# Patient Record
Sex: FEMALE | Race: BLACK OR AFRICAN AMERICAN | NOT HISPANIC OR LATINO | Employment: FULL TIME | RURAL
[De-identification: names, ages, dates, MRNs, and addresses within clinical notes are randomized per-mention and may not be internally consistent; named-entity substitution may affect disease eponyms.]

---

## 2017-07-27 ENCOUNTER — OFFICE VISIT (OUTPATIENT)
Dept: FAMILY MEDICINE CLINIC | Facility: CLINIC | Age: 27
End: 2017-07-27

## 2017-07-27 VITALS
TEMPERATURE: 98.7 F | SYSTOLIC BLOOD PRESSURE: 110 MMHG | HEART RATE: 99 BPM | RESPIRATION RATE: 16 BRPM | HEIGHT: 66 IN | DIASTOLIC BLOOD PRESSURE: 70 MMHG | WEIGHT: 138.4 LBS | BODY MASS INDEX: 22.24 KG/M2

## 2017-07-27 DIAGNOSIS — R51.9 NONINTRACTABLE HEADACHE, UNSPECIFIED CHRONICITY PATTERN, UNSPECIFIED HEADACHE TYPE: Primary | ICD-10-CM

## 2017-07-27 DIAGNOSIS — Z71.6 TOBACCO ABUSE COUNSELING: ICD-10-CM

## 2017-07-27 DIAGNOSIS — Z72.0 TOBACCO USER: ICD-10-CM

## 2017-07-27 DIAGNOSIS — Z13.1 ENCOUNTER FOR SCREENING FOR DIABETES MELLITUS: ICD-10-CM

## 2017-07-27 DIAGNOSIS — Z13.6 ENCOUNTER FOR SCREENING FOR CARDIOVASCULAR DISORDERS: ICD-10-CM

## 2017-07-27 DIAGNOSIS — Z13.29 ENCOUNTER FOR SCREENING FOR ENDOCRINE DISORDER: ICD-10-CM

## 2017-07-27 PROCEDURE — 99204 OFFICE O/P NEW MOD 45 MIN: CPT | Performed by: FAMILY MEDICINE

## 2017-07-27 RX ORDER — TOPIRAMATE 25 MG/1
25 TABLET ORAL 2 TIMES DAILY
Qty: 30 TABLET | Refills: 11 | Status: SHIPPED | OUTPATIENT
Start: 2017-07-27 | End: 2017-10-05 | Stop reason: SDUPTHER

## 2017-07-27 RX ORDER — IBUPROFEN 600 MG/1
600 TABLET ORAL EVERY 6 HOURS PRN
COMMUNITY
End: 2019-03-20

## 2017-07-27 RX ORDER — NICOTINE 21 MG/24HR
1 PATCH, TRANSDERMAL 24 HOURS TRANSDERMAL EVERY 24 HOURS
Qty: 30 PATCH | Refills: 11 | Status: SHIPPED | OUTPATIENT
Start: 2017-07-27 | End: 2017-10-05 | Stop reason: SDUPTHER

## 2017-07-27 NOTE — PROGRESS NOTES
"Subjective   Shakeetra Fox Yanez is a 26 y.o. female.     History of Present Illness     Problem List  1. Headaches   2. Tobacco smoker (1/2 ppd)    PSHx  C section X 3    Family Hx  -mother - hypertension, diabetes  -father -unknown  -siblings - asthma, PTSD, hearing problems    Social Hx  - ()  -3 children  -nonalcoholic  -tobacco smoker  -Works at [employer]     Pt is 25 yo female who I am seeing for the first time. She is here to establish. Pt states she is overall healthy but has been suffering from headaches for the past month.  States it is bilateral frontal area and occur about 3 times a week and each episode would last for a day.  Does have photophobia and phonophobia.  Pt does smoke 1/2 of tobacco. Also does not drink enough fluids. Only drinks 2 bottles of water a day.  Has yet to have labwork.  No weakness. Does get fatigued and tired easily.  Pt does not drink alcohol. Works at [employer]         The following portions of the patient's history were reviewed and updated as appropriate: allergies, current medications, past family history, past medical history, past social history, past surgical history and problem list.    Review of Systems   Constitutional: Positive for fatigue.   Eyes: Negative.    Respiratory: Negative.    Cardiovascular: Negative.    Gastrointestinal: Negative.    Endocrine: Negative.    Genitourinary: Negative.    Musculoskeletal: Negative.    Skin: Negative.    Allergic/Immunologic: Negative.    Neurological: Positive for headaches.   Hematological: Negative.    Psychiatric/Behavioral: Negative.        Objective    /70  Pulse 99  Temp 98.7 °F (37.1 °C)  Resp 16  Ht 66\" (167.6 cm)  Wt 138 lb 6.4 oz (62.8 kg)  BMI 22.34 kg/m2    No results found for any previous visit.    Physical Exam   Constitutional: She is oriented to person, place, and time. She appears well-developed and well-nourished. No distress.   HENT:   Head: Normocephalic and " atraumatic.   Right Ear: External ear normal.   Left Ear: External ear normal.   Eyes: Conjunctivae and EOM are normal. Pupils are equal, round, and reactive to light. Right eye exhibits no discharge. Left eye exhibits no discharge. No scleral icterus.   Neck: Normal range of motion. Neck supple. No JVD present. No tracheal deviation present. No thyromegaly present.   Cardiovascular: Normal rate, regular rhythm and normal heart sounds.    Pulmonary/Chest: Effort normal and breath sounds normal. No stridor. No respiratory distress. She has no wheezes.   Abdominal: Soft. Bowel sounds are normal. She exhibits no distension and no mass. There is no tenderness. There is no rebound and no guarding. No hernia.   Musculoskeletal: Normal range of motion. She exhibits no edema, tenderness or deformity.   Lymphadenopathy:     She has no cervical adenopathy.   Neurological: She is alert and oriented to person, place, and time. She has normal reflexes. No cranial nerve deficit. Coordination normal.   CN 2-12 intact    Skin: Skin is warm and dry. No rash noted. She is not diaphoretic. No erythema. No pallor.   Psychiatric: She has a normal mood and affect. Her behavior is normal.   Nursing note and vitals reviewed.      Assessment/Plan   Problems Addressed this Visit        Nervous and Auditory    Nonintractable headache - Primary       Other    Encounter for screening for endocrine disorder    Encounter for screening for cardiovascular disorders    Encounter for screening for diabetes mellitus    Relevant Orders    CBC Auto Differential    Comprehensive Metabolic Panel    Hemoglobin A1c    TSH    T4, Free    T3, Free    Lipid Panel    Tobacco abuse counseling    Tobacco user      - for headaches -Possible headaches are migraines.  Gave information on headaches to go home with and migraines. Recommend pt drink enough water and keep headache diary.  Consider MRI of brain if it gets worse. Recommend tobacco cessation.  Will start  on topamax 25 mg PO BID. Drug information given  - will get hga1c, thyroid studies and lipid panel for diabetes , endocrine and cardiovascular disorder screening  - tobacco user - counseled pt to quit >5 minutes.  Nicotine patch 21 mg /daily  - recheck in 1 month          This document has been electronically signed by Alvaro Vogel MD on July 27, 2017 9:59 AM

## 2017-07-27 NOTE — PATIENT INSTRUCTIONS
DRINK A LOT OF WATER  GET LABWORK  QUIT SMOKING    Come back in 1 month     Migraine Headache  A migraine headache is an intense, throbbing pain on one or both sides of your head. A migraine can last for 30 minutes to several hours.  CAUSES   The exact cause of a migraine headache is not always known. However, a migraine may be caused when nerves in the brain become irritated and release chemicals that cause inflammation. This causes pain.  Certain things may also trigger migraines, such as:  · Alcohol.  · Smoking.  · Stress.  · Menstruation.  · Aged cheeses.  · Foods or drinks that contain nitrates, glutamate, aspartame, or tyramine.  · Lack of sleep.  · Chocolate.  · Caffeine.  · Hunger.  · Physical exertion.  · Fatigue.  · Medicines used to treat chest pain (nitroglycerine), birth control pills, estrogen, and some blood pressure medicines.  SIGNS AND SYMPTOMS  · Pain on one or both sides of your head.  · Pulsating or throbbing pain.  · Severe pain that prevents daily activities.  · Pain that is aggravated by any physical activity.  · Nausea, vomiting, or both.  · Dizziness.  · Pain with exposure to bright lights, loud noises, or activity.  · General sensitivity to bright lights, loud noises, or smells.  Before you get a migraine, you may get warning signs that a migraine is coming (aura). An aura may include:  · Seeing flashing lights.  · Seeing bright spots, halos, or zigzag lines.  · Having tunnel vision or blurred vision.  · Having feelings of numbness or tingling.  · Having trouble talking.  · Having muscle weakness.  DIAGNOSIS   A migraine headache is often diagnosed based on:  · Symptoms.  · Physical exam.  · A CT scan or MRI of your head. These imaging tests cannot diagnose migraines, but they can help rule out other causes of headaches.  TREATMENT  Medicines may be given for pain and nausea. Medicines can also be given to help prevent recurrent migraines.   HOME CARE INSTRUCTIONS  · Only take  over-the-counter or prescription medicines for pain or discomfort as directed by your health care provider. The use of long-term narcotics is not recommended.  · Lie down in a dark, quiet room when you have a migraine.  · Keep a journal to find out what may trigger your migraine headaches. For example, write down:    What you eat and drink.    How much sleep you get.    Any change to your diet or medicines.  · Limit alcohol consumption.  · Quit smoking if you smoke.  · Get 7-9 hours of sleep, or as recommended by your health care provider.  · Limit stress.  · Keep lights dim if bright lights bother you and make your migraines worse.  SEEK IMMEDIATE MEDICAL CARE IF:   · Your migraine becomes severe.  · You have a fever.  · You have a stiff neck.  · You have vision loss.  · You have muscular weakness or loss of muscle control.  · You start losing your balance or have trouble walking.  · You feel faint or pass out.  · You have severe symptoms that are different from your first symptoms.  MAKE SURE YOU:   · Understand these instructions.  · Will watch your condition.  · Will get help right away if you are not doing well or get worse.     This information is not intended to replace advice given to you by your health care provider. Make sure you discuss any questions you have with your health care provider.     Document Released: 12/18/2006 Document Revised: 01/08/2016 Document Reviewed: 08/25/2014  [employer] Interactive Patient Education ©2017 [employer]  Topiramate tablets  What is this medicine?  TOPIRAMATE (toe PYRE a mate) is used to treat seizures in adults or children with epilepsy. It is also used for the prevention of migraine headaches.  This medicine may be used for other purposes; ask your health care provider or pharmacist if you have questions.  COMMON BRAND NAME(S): Topamax, Topiragen  What should I tell my health care provider before I take this medicine?  They need to know if you have any of these  conditions:  -bleeding disorders  -cirrhosis of the liver or liver disease  -diarrhea  -glaucoma  -kidney stones or kidney disease  -low blood counts, like low white cell, platelet, or red cell counts  -lung disease like asthma, obstructive pulmonary disease, emphysema  -metabolic acidosis  -on a ketogenic diet  -schedule for surgery or a procedure  -suicidal thoughts, plans, or attempt; a previous suicide attempt by you or a family member  -an unusual or allergic reaction to topiramate, other medicines, foods, dyes, or preservatives  -pregnant or trying to get pregnant  -breast-feeding  How should I use this medicine?  Take this medicine by mouth with a glass of water. Follow the directions on the prescription label. Do not crush or chew. You may take this medicine with meals. Take your medicine at regular intervals. Do not take it more often than directed.  Talk to your pediatrician regarding the use of this medicine in children. Special care may be needed. While this drug may be prescribed for children as young as 2 years of age for selected conditions, precautions do apply.  Overdosage: If you think you have taken too much of this medicine contact a poison control center or emergency room at once.  NOTE: This medicine is only for you. Do not share this medicine with others.  What if I miss a dose?  If you miss a dose, take it as soon as you can. If your next dose is to be taken in less than 6 hours, then do not take the missed dose. Take the next dose at your regular time. Do not take double or extra doses.  What may interact with this medicine?  Do not take this medicine with any of the following medications:  -probenecid  This medicine may also interact with the following medications:  -acetazolamide  -alcohol  -amitriptyline  -aspirin and aspirin-like medicines  -birth control pills  -certain medicines for depression  -certain medicines for seizures  -certain medicines that treat or prevent blood clots like  warfarin, enoxaparin, dalteparin, apixaban, dabigatran, and rivaroxaban  -digoxin  -hydrochlorothiazide  -lithium  -medicines for pain, sleep, or muscle relaxation  -metformin  -methazolamide  -NSAIDS, medicines for pain and inflammation, like ibuprofen or naproxen  -pioglitazone  -risperidone  This list may not describe all possible interactions. Give your health care provider a list of all the medicines, herbs, non-prescription drugs, or dietary supplements you use. Also tell them if you smoke, drink alcohol, or use illegal drugs. Some items may interact with your medicine.  What should I watch for while using this medicine?  Visit your doctor or health care professional for regular checks on your progress. Do not stop taking this medicine suddenly. This increases the risk of seizures if you are using this medicine to control epilepsy. Wear a medical identification bracelet or chain to say you have epilepsy or seizures, and carry a card that lists all your medicines.  This medicine can decrease sweating and increase your body temperature. Watch for signs of  sweating or fever, especially in children. Avoid extreme heat, hot baths, and saunas. Be careful about exercising, especially in hot weather. Contact your health care provider right away if you notice a fever or decrease in sweating.  You should drink plenty of fluids while taking this medicine. If you have had kidney stones in the past, this will help to reduce your chances of forming kidney stones.  If you have stomach pain, with nausea or vomiting and yellowing of your eyes or skin, call your doctor immediately.  You may get drowsy, dizzy, or have blurred vision. Do not drive, use machinery, or do anything that needs mental alertness until you know how this medicine affects you. To reduce dizziness, do not sit or stand up quickly, especially if you are an older patient. Alcohol can increase drowsiness and dizziness. Avoid alcoholic drinks.  If you  notice blurred vision, eye pain, or other eye problems, seek medical attention at once for an eye exam.  The use of this medicine may increase the chance of suicidal thoughts or actions. Pay special attention to how you are responding while on this medicine. Any worsening of mood, or thoughts of suicide or dying should be reported to your health care professional right away.  This medicine may increase the chance of developing metabolic acidosis. If left untreated, this can cause kidney stones, bone disease, or slowed growth in children. Symptoms include breathing fast, fatigue, loss of appetite, irregular heartbeat, or loss of consciousness. Call your doctor immediately if you experience any of these side effects. Also, tell your doctor about any surgery you plan on having while taking this medicine since this may increase your risk for metabolic acidosis.  Birth control pills may not work properly while you are taking this medicine. Talk to your doctor about using an extra method of birth control.  Women who become pregnant while using this medicine may enroll in the [place] Antiepileptic Drug Pregnancy Registry by calling 1-844.295.9668. This registry collects information about the safety of antiepileptic drug use during pregnancy.  What side effects may I notice from receiving this medicine?  Side effects that you should report to your doctor or health care professional as soon as possible:  -allergic reactions like skin rash, itching or hives, swelling of the face, lips, or tongue  -decreased sweating and/or rise in body temperature  -depression  -difficulty breathing, fast or irregular breathing patterns  -difficulty speaking  -difficulty walking or controlling muscle movements  -hearing impairment  -redness, blistering, peeling or loosening of the skin, including inside the mouth  -tingling, pain or numbness in the hands or feet  -unusual bleeding or bruising  -unusually weak or tired  -worsening of  mood, thoughts or actions of suicide or dying  Side effects that usually do not require medical attention (report to your doctor or health care professional if they continue or are bothersome):  -altered taste  -back pain, joint or muscle aches and pains  -diarrhea, or constipation  -headache  -loss of appetite  -nausea  -stomach upset, indigestion  -tremors  This list may not describe all possible side effects. Call your doctor for medical advice about side effects. You may report side effects to FDA at 7-920-UMR-7089.  Where should I keep my medicine?  Keep out of the reach of children.  Store at room temperature between 15 and 30 degrees C (59 and 86 degrees F) in a tightly closed container. Protect from moisture. Throw away any unused medicine after the expiration date.  NOTE: This sheet is a summary. It may not cover all possible information. If you have questions about this medicine, talk to your doctor, pharmacist, or health care provider.     © 2017, [employer]. (2014-12-22 23:17:57)

## 2017-09-05 ENCOUNTER — TELEPHONE (OUTPATIENT)
Dept: FAMILY MEDICINE CLINIC | Facility: CLINIC | Age: 27
End: 2017-09-05

## 2017-10-05 ENCOUNTER — OFFICE VISIT (OUTPATIENT)
Dept: FAMILY MEDICINE CLINIC | Facility: CLINIC | Age: 27
End: 2017-10-05

## 2017-10-05 VITALS
HEIGHT: 66 IN | TEMPERATURE: 98.7 F | SYSTOLIC BLOOD PRESSURE: 110 MMHG | WEIGHT: 152 LBS | HEART RATE: 98 BPM | BODY MASS INDEX: 24.43 KG/M2 | DIASTOLIC BLOOD PRESSURE: 60 MMHG | RESPIRATION RATE: 16 BRPM

## 2017-10-05 DIAGNOSIS — Z23 NEED FOR VACCINATION: ICD-10-CM

## 2017-10-05 DIAGNOSIS — R51.9 NONINTRACTABLE HEADACHE, UNSPECIFIED CHRONICITY PATTERN, UNSPECIFIED HEADACHE TYPE: ICD-10-CM

## 2017-10-05 DIAGNOSIS — Z72.0 TOBACCO USER: ICD-10-CM

## 2017-10-05 DIAGNOSIS — Z13.6 ENCOUNTER FOR SCREENING FOR CARDIOVASCULAR DISORDERS: ICD-10-CM

## 2017-10-05 DIAGNOSIS — Z13.1 ENCOUNTER FOR SCREENING FOR DIABETES MELLITUS: Primary | ICD-10-CM

## 2017-10-05 DIAGNOSIS — Z13.29 ENCOUNTER FOR SCREENING FOR ENDOCRINE DISORDER: ICD-10-CM

## 2017-10-05 PROCEDURE — 90471 IMMUNIZATION ADMIN: CPT | Performed by: FAMILY MEDICINE

## 2017-10-05 PROCEDURE — 90732 PPSV23 VACC 2 YRS+ SUBQ/IM: CPT | Performed by: FAMILY MEDICINE

## 2017-10-05 PROCEDURE — 90472 IMMUNIZATION ADMIN EACH ADD: CPT | Performed by: FAMILY MEDICINE

## 2017-10-05 PROCEDURE — 99213 OFFICE O/P EST LOW 20 MIN: CPT | Performed by: FAMILY MEDICINE

## 2017-10-05 PROCEDURE — 90715 TDAP VACCINE 7 YRS/> IM: CPT | Performed by: FAMILY MEDICINE

## 2017-10-05 RX ORDER — TOPIRAMATE 25 MG/1
25 TABLET ORAL 2 TIMES DAILY
Qty: 30 TABLET | Refills: 11 | Status: SHIPPED | OUTPATIENT
Start: 2017-10-05 | End: 2019-03-20

## 2017-10-05 RX ORDER — NICOTINE 21 MG/24HR
1 PATCH, TRANSDERMAL 24 HOURS TRANSDERMAL EVERY 24 HOURS
Qty: 30 PATCH | Refills: 11 | Status: SHIPPED | OUTPATIENT
Start: 2017-10-05 | End: 2018-04-26 | Stop reason: SDUPTHER

## 2017-10-05 NOTE — PROGRESS NOTES
Subjective   Shakeetra Fox Yanez is a 27 y.o. female.       Problem List  1. Headaches   2. Tobacco smoker (1/2 ppd)     PSHx  C section X 3     Family Hx  -mother - hypertension, diabetes  -father -unknown  -siblings - asthma, PTSD, hearing problems     Social Hx  - ()  -3 children  -nonalcoholic  -tobacco smoker smokes 1/2 ppd   -Works at [employer]     Pt is 26 yo female with the above medical issues. Is here for recheck. Pt on last visit was started on topamax 25 mg PO BID for headaches. Pt states medication is helping and since taking medication her headaches have improved.  Denies any migraine headaches. Did not get labwork rom last visit. Pt stopped smoking and tried nicotine patches but ran out.  She has gone back to smoke a few cigarettes a day    Headache    This is a recurrent problem. The current episode started more than 1 year ago. The problem has been resolved. The pain is located in the bilateral region. The pain is at a severity of 0/10. The patient is experiencing no pain. Pertinent negatives include no abdominal pain, abnormal behavior, anorexia, back pain, blurred vision, coughing, dizziness, drainage, ear pain, eye pain, eye redness, eye watering, facial sweating, fever, hearing loss, insomnia, loss of balance, muscle aches, nausea, neck pain, numbness, phonophobia, photophobia, rhinorrhea, scalp tenderness, sinus pressure, sore throat, swollen glands, tingling, tinnitus, visual change, vomiting, weakness or weight loss. Nothing aggravates the symptoms. Treatments tried: topamax 25 mg PO BID  The treatment provided no relief. There is no history of cancer, cluster headaches, hypertension, immunosuppression, migraine headaches, migraines in the family, obesity, pseudotumor cerebri, recent head traumas, sinus disease or TMJ.        The following portions of the patient's history were reviewed and updated as appropriate: allergies, current medications, past family  "history, past medical history, past social history, past surgical history and problem list.    Review of Systems   Constitutional: Negative.  Negative for fever and weight loss.   HENT: Negative for ear pain, hearing loss, rhinorrhea, sinus pressure, sore throat and tinnitus.    Eyes: Negative.  Negative for blurred vision, photophobia, pain and redness.   Respiratory: Negative.  Negative for cough.    Cardiovascular: Negative.    Gastrointestinal: Negative.  Negative for abdominal pain, anorexia, nausea and vomiting.   Endocrine: Negative.    Genitourinary: Negative.    Musculoskeletal: Negative.  Negative for back pain and neck pain.   Skin: Negative.    Allergic/Immunologic: Negative.    Neurological: Positive for headaches. Negative for dizziness, tingling, weakness, numbness and loss of balance.   Hematological: Negative.    Psychiatric/Behavioral: Negative.  The patient does not have insomnia.        Objective    /60  Pulse 98  Temp 98.7 °F (37.1 °C)  Resp 16  Ht 66\" (167.6 cm)  Wt 152 lb (68.9 kg)  BMI 24.53 kg/m2      Chemistry    No results found for: NA, K, CL, CO2, BUN, CREATININE, GLU No results found for: CALCIUM, ALKPHOS, AST, ALT, BILITOT     .  Physical Exam   Constitutional: She is oriented to person, place, and time. She appears well-developed and well-nourished. No distress.   HENT:   Head: Normocephalic.   Right Ear: External ear normal.   Left Ear: External ear normal.   Eyes: Conjunctivae and EOM are normal. Pupils are equal, round, and reactive to light. Right eye exhibits no discharge. Left eye exhibits no discharge. No scleral icterus.   Neck: Normal range of motion. Neck supple. No JVD present. No tracheal deviation present. No thyromegaly present.   Cardiovascular: Normal rate and regular rhythm.    Pulmonary/Chest: Effort normal and breath sounds normal. No stridor. No respiratory distress. She has no wheezes.   Abdominal: Soft. Bowel sounds are normal. She exhibits no " distension and no mass. There is no tenderness. There is no rebound and no guarding. No hernia.   Musculoskeletal: Normal range of motion. She exhibits no edema, tenderness or deformity.   Lymphadenopathy:     She has no cervical adenopathy.   Neurological: She is alert and oriented to person, place, and time. She has normal reflexes. No cranial nerve deficit. Coordination normal.   Skin: Skin is warm and dry. No rash noted. She is not diaphoretic. No erythema. No pallor.   Psychiatric: She has a normal mood and affect. Her behavior is normal.   Nursing note and vitals reviewed.      Assessment/Plan   Problems Addressed this Visit        Nervous and Auditory    Nonintractable headache       Other    Encounter for screening for endocrine disorder    Encounter for screening for cardiovascular disorders    Encounter for screening for diabetes mellitus - Primary    Relevant Orders    CBC Auto Differential    Comprehensive Metabolic Panel    Hemoglobin A1c    Lipid Panel    TSH    T4, Free    T3, Free    Tobacco user      - for headaches - continue topamax 25 mg PO BId  - reordered labwork for diabetes, endocrine and cardiovascular disorder screening  - tobacco user - nicotine patches reordered. Recommended 1 800 QUIT NOW  - Pt agreed to Tdap and pneumovax 23 vaccination today.    - recheck in 3 months or earlier if any problems arise

## 2018-04-26 ENCOUNTER — TELEPHONE (OUTPATIENT)
Dept: FAMILY MEDICINE CLINIC | Facility: CLINIC | Age: 28
End: 2018-04-26

## 2018-04-26 RX ORDER — NICOTINE 21 MG/24HR
1 PATCH, TRANSDERMAL 24 HOURS TRANSDERMAL EVERY 24 HOURS
Qty: 30 PATCH | Refills: 3 | Status: SHIPPED | OUTPATIENT
Start: 2018-04-26 | End: 2019-03-20

## 2019-03-19 PROBLEM — Z12.4 ENCOUNTER FOR PAPANICOLAOU SMEAR OF CERVIX: Status: ACTIVE | Noted: 2019-03-19

## 2019-03-19 NOTE — PROGRESS NOTES
Subjective   Shakeetra Fox Yanez is a 28 y.o. female.       Problem List  1. Headaches   2. Tobacco smoker (1/2 ppd)     PSHx  C section X 3     Family Hx  -mother - hypertension, diabetes  -father -unknown  -siblings - asthma, PTSD, hearing problems     Social Hx  - ()  -3 children  -nonalcoholic  -tobacco smoker smokes 1/2 ppd   -Works at [employer]     Pt is 26 yo female with the above medical issues. Is here for recheck. Pt on last visit was started on topamax 25 mg PO BID for headaches. Pt states medication is helping and since taking medication her headaches have improved.  Denies any migraine headaches. Did not get labwork rom last visit. Pt stopped smoking and tried nicotine patches but ran out.  She has gone back to smoke a few cigarettes a day    3/20/19 pt is here for recheck and followup on tobacco use and headaches. SHe is due for new labwork. She is due for pap smear. She is overall doing well. Her only issue is sore throat for past 2 days. Denies any fever. 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      Sore Throat    This is a new problem. The current episode started in the past 7 days. The problem has been gradually worsening. Neither side of throat is experiencing more pain than the other. There has been no fever. The pain is at a severity of 3/10. The pain is mild. Pertinent negatives include no abdominal pain, congestion, coughing, diarrhea, drooling, ear discharge, ear pain, headaches, hoarse voice, plugged ear sensation, neck pain, shortness of breath, stridor, swollen glands, trouble swallowing or vomiting. She has had no exposure to strep or mono.        The following portions of the patient's history were reviewed and updated as appropriate: allergies, current medications, past family history, past medical history, past social history, past surgical history and problem list.  Patient Active Problem List   Diagnosis   • Encounter for screening for endocrine  "disorder   • Encounter for screening for cardiovascular disorders   • Nonintractable headache   • Encounter for screening for diabetes mellitus   • Tobacco abuse counseling   • Tobacco user   • Encounter for Papanicolaou smear of cervix   • Acute pharyngitis     Current Outpatient Medications on File Prior to Visit   Medication Sig Dispense Refill   • [DISCONTINUED] ibuprofen (ADVIL,MOTRIN) 600 MG tablet Take 600 mg by mouth Every 6 (Six) Hours As Needed for Mild Pain (1-3).     • [DISCONTINUED] nicotine (NICODERM CQ) 21 MG/24HR patch Place 1 patch on the skin Daily. 30 patch 3   • [DISCONTINUED] topiramate (TOPAMAX) 25 MG tablet Take 1 tablet by mouth 2 (Two) Times a Day. 30 tablet 11     No current facility-administered medications on file prior to visit.        Review of Systems   Constitutional: Negative.    HENT: Positive for sore throat. Negative for congestion, drooling, ear discharge, ear pain, hoarse voice and trouble swallowing.    Eyes: Negative.    Respiratory: Negative.  Negative for cough, shortness of breath and stridor.    Cardiovascular: Negative.    Gastrointestinal: Negative.  Negative for abdominal pain, diarrhea and vomiting.   Endocrine: Negative.    Genitourinary: Negative.    Musculoskeletal: Negative.  Negative for neck pain.   Skin: Negative.    Allergic/Immunologic: Negative.    Neurological: Negative for headaches.   Hematological: Negative.    Psychiatric/Behavioral: Negative.        Objective    /72   Pulse 100   Temp 98.8 °F (37.1 °C)   Ht 170.2 cm (67\")   Wt 66.3 kg (146 lb 3.2 oz)   SpO2 100%   BMI 22.90 kg/m²     /72   Pulse 100   Temp 98.8 °F (37.1 °C)   Ht 170.2 cm (67\")   Wt 66.3 kg (146 lb 3.2 oz)   SpO2 100%   BMI 22.90 kg/m²       Chemistry    No results found for: NA, K, CL, CO2, BUN, CREATININE, GLU No results found for: CALCIUM, ALKPHOS, AST, ALT, BILITOT     .  Physical Exam   Constitutional: She is oriented to person, place, and time. She appears " well-developed and well-nourished. No distress.   HENT:   Head: Normocephalic.   Right Ear: External ear normal.   Left Ear: External ear normal.   Eyes: Conjunctivae and EOM are normal. Pupils are equal, round, and reactive to light. Right eye exhibits no discharge. Left eye exhibits no discharge. No scleral icterus.   Neck: Normal range of motion. Neck supple. No JVD present. No tracheal deviation present. No thyromegaly present.   Cardiovascular: Normal rate and regular rhythm.   Pulmonary/Chest: Effort normal and breath sounds normal. No stridor. No respiratory distress. She has no wheezes.   Abdominal: Soft. Bowel sounds are normal. She exhibits no distension and no mass. There is no tenderness. There is no rebound and no guarding. No hernia.   Musculoskeletal: Normal range of motion. She exhibits no edema, tenderness or deformity.   Lymphadenopathy:     She has no cervical adenopathy.   Neurological: She is alert and oriented to person, place, and time. She has normal reflexes. No cranial nerve deficit. Coordination normal.   Skin: Skin is warm and dry. No rash noted. She is not diaphoretic. No erythema. No pallor.   Psychiatric: She has a normal mood and affect. Her behavior is normal.   Nursing note and vitals reviewed.      Assessment/Plan   Problems Addressed this Visit        Respiratory    Acute pharyngitis - Primary       Other    Encounter for screening for endocrine disorder    Encounter for screening for cardiovascular disorders    Encounter for screening for diabetes mellitus    Tobacco abuse counseling    Tobacco user    Encounter for Papanicolaou smear of cervix    Relevant Orders    Ambulatory Referral to Obstetrics / Gynecology        -will get labwork  -recommend pap smear and referral to OB/GYN. Referral to [facility] consultation apprecaited  -sore throat, acute pharyngitis, magic mouth wash, honey   - reordered labwork for diabetes, endocrine and cardiovascular disorder screening  -  tobacco user - nicotine patches reordered. Recommended 1 800 QUIT NOW. Counseled >5 minutes to quit. Will try chantix starting pack and maintenance pack   - Pt agreed to Tdap and pneumovax 23 vaccination today.    - recheck in 1 month         This document has been electronically signed by Alvaro Vogel MD on March 20, 2019 10:57 AM

## 2019-03-20 ENCOUNTER — OFFICE VISIT (OUTPATIENT)
Dept: FAMILY MEDICINE CLINIC | Facility: CLINIC | Age: 29
End: 2019-03-20

## 2019-03-20 VITALS
WEIGHT: 146.2 LBS | DIASTOLIC BLOOD PRESSURE: 72 MMHG | HEIGHT: 67 IN | OXYGEN SATURATION: 100 % | HEART RATE: 100 BPM | SYSTOLIC BLOOD PRESSURE: 104 MMHG | TEMPERATURE: 98.8 F | BODY MASS INDEX: 22.95 KG/M2

## 2019-03-20 DIAGNOSIS — Z71.6 TOBACCO ABUSE COUNSELING: ICD-10-CM

## 2019-03-20 DIAGNOSIS — Z13.6 ENCOUNTER FOR SCREENING FOR CARDIOVASCULAR DISORDERS: ICD-10-CM

## 2019-03-20 DIAGNOSIS — Z72.0 TOBACCO USER: ICD-10-CM

## 2019-03-20 DIAGNOSIS — Z00.00 GENERAL MEDICAL EXAMINATION: Primary | ICD-10-CM

## 2019-03-20 DIAGNOSIS — Z13.29 ENCOUNTER FOR SCREENING FOR ENDOCRINE DISORDER: ICD-10-CM

## 2019-03-20 DIAGNOSIS — Z13.1 ENCOUNTER FOR SCREENING FOR DIABETES MELLITUS: ICD-10-CM

## 2019-03-20 DIAGNOSIS — J02.9 ACUTE PHARYNGITIS, UNSPECIFIED ETIOLOGY: ICD-10-CM

## 2019-03-20 DIAGNOSIS — Z12.4 ENCOUNTER FOR PAPANICOLAOU SMEAR OF CERVIX: ICD-10-CM

## 2019-03-20 PROCEDURE — 99213 OFFICE O/P EST LOW 20 MIN: CPT | Performed by: FAMILY MEDICINE

## 2019-03-20 RX ORDER — VARENICLINE TARTRATE 0.5 MG/1
TABLET, FILM COATED ORAL
Qty: 90 TABLET | Refills: 0 | Status: SHIPPED | OUTPATIENT
Start: 2019-03-20 | End: 2020-01-06

## 2019-03-20 RX ORDER — VARENICLINE TARTRATE 1 MG/1
1 TABLET, FILM COATED ORAL 2 TIMES DAILY
Qty: 60 TABLET | Refills: 3 | Status: SHIPPED | OUTPATIENT
Start: 2019-03-20 | End: 2020-01-06

## 2019-03-20 NOTE — PATIENT INSTRUCTIONS
Steps to Quit Smoking  Smoking tobacco can be harmful to your health and can affect almost every organ in your body. Smoking puts you, and those around you, at risk for developing many serious chronic diseases. Quitting smoking is difficult, but it is one of the best things that you can do for your health. It is never too late to quit.  What are the benefits of quitting smoking?  When you quit smoking, you lower your risk of developing serious diseases and conditions, such as:  · Lung cancer or lung disease, such as COPD.  · Heart disease.  · Stroke.  · Heart attack.  · Infertility.  · Osteoporosis and bone fractures.    Additionally, symptoms such as coughing, wheezing, and shortness of breath may get better when you quit. You may also find that you get sick less often because your body is stronger at fighting off colds and infections. If you are pregnant, quitting smoking can help to reduce your chances of having a baby of low birth weight.  How do I get ready to quit?  When you decide to quit smoking, create a plan to make sure that you are successful. Before you quit:  · Pick a date to quit. Set a date within the next two weeks to give you time to prepare.  · Write down the reasons why you are quitting. Keep this list in places where you will see it often, such as on your bathroom mirror or in your car or wallet.  · Identify the people, places, things, and activities that make you want to smoke (triggers) and avoid them. Make sure to take these actions:  ? Throw away all cigarettes at home, at work, and in your car.  ? Throw away smoking accessories, such as ashtrays and lighters.  ? Clean your car and make sure to empty the ashtray.  ? Clean your home, including curtains and carpets.  · Tell your family, friends, and coworkers that you are quitting. Support from your loved ones can make quitting easier.  · Talk with your health care provider about your options for quitting smoking.  · Find out what treatment  options are covered by your health insurance.    What strategies can I use to quit smoking?  Talk with your healthcare provider about different strategies to quit smoking. Some strategies include:  · Quitting smoking altogether instead of gradually lessening how much you smoke over a period of time. Research shows that quitting “cold turkey” is more successful than gradually quitting.  · Attending in-person counseling to help you build problem-solving skills. You are more likely to have success in quitting if you attend several counseling sessions. Even short sessions of 10 minutes can be effective.  · Finding resources and support systems that can help you to quit smoking and remain smoke-free after you quit. These resources are most helpful when you use them often. They can include:  ? Online chats with a counselor.  ? Telephone quitlines.  ? Printed self-help materials.  ? Support groups or group counseling.  ? Text messaging programs.  ? Mobile phone applications.  · Taking medicines to help you quit smoking. (If you are pregnant or breastfeeding, talk with your health care provider first.) Some medicines contain nicotine and some do not. Both types of medicines help with cravings, but the medicines that include nicotine help to relieve withdrawal symptoms. Your health care provider may recommend:  ? Nicotine patches, gum, or lozenges.  ? Nicotine inhalers or sprays.  ? Non-nicotine medicine that is taken by mouth.    Talk with your health care provider about combining strategies, such as taking medicines while you are also receiving in-person counseling. Using these two strategies together makes you more likely to succeed in quitting than if you used either strategy on its own.  If you are pregnant or breastfeeding, talk with your health care provider about finding counseling or other support strategies to quit smoking. Do not take medicine to help you quit smoking unless told to do so by your health care  provider.  What things can I do to make it easier to quit?  Quitting smoking might feel overwhelming at first, but there is a lot that you can do to make it easier. Take these important actions:  · Reach out to your family and friends and ask that they support and encourage you during this time. Call telephone quitlines, reach out to support groups, or work with a counselor for support.  · Ask people who smoke to avoid smoking around you.  · Avoid places that trigger you to smoke, such as bars, parties, or smoke-break areas at work.  · Spend time around people who do not smoke.  · Lessen stress in your life, because stress can be a smoking trigger for some people. To lessen stress, try:  ? Exercising regularly.  ? Deep-breathing exercises.  ? Yoga.  ? Meditating.  ? Performing a body scan. This involves closing your eyes, scanning your body from head to toe, and noticing which parts of your body are particularly tense. Purposefully relax the muscles in those areas.  · Download or purchase mobile phone or tablet apps (applications) that can help you stick to your quit plan by providing reminders, tips, and encouragement. There are many free apps, such as QuitGuide from the CDC (Centers for Disease Control and Prevention). You can find other support for quitting smoking (smoking cessation) through smokefree.gov and other websites.    How will I feel when I quit smoking?  Within the first 24 hours of quitting smoking, you may start to feel some withdrawal symptoms. These symptoms are usually most noticeable 2-3 days after quitting, but they usually do not last beyond 2-3 weeks. Changes or symptoms that you might experience include:  · Mood swings.  · Restlessness, anxiety, or irritation.  · Difficulty concentrating.  · Dizziness.  · Strong cravings for sugary foods in addition to nicotine.  · Mild weight gain.  · Constipation.  · Nausea.  · Coughing or a sore throat.  · Changes in how your medicines work in your  body.  · A depressed mood.  · Difficulty sleeping (insomnia).    After the first 2-3 weeks of quitting, you may start to notice more positive results, such as:  · Improved sense of smell and taste.  · Decreased coughing and sore throat.  · Slower heart rate.  · Lower blood pressure.  · Clearer skin.  · The ability to breathe more easily.  · Fewer sick days.    Quitting smoking is very challenging for most people. Do not get discouraged if you are not successful the first time. Some people need to make many attempts to quit before they achieve long-term success. Do your best to stick to your quit plan, and talk with your health care provider if you have any questions or concerns.  This information is not intended to replace advice given to you by your health care provider. Make sure you discuss any questions you have with your health care provider.  Document Released: 12/12/2002 Document Revised: 07/24/2018 Document Reviewed: 05/03/2016  [employer] Interactive Patient Education © 2019 [employer]  Varenicline oral tablets  What is this medicine?  VARENICLINE (bret EN i kleen) is used to help people quit smoking. It is used with a patient support program recommended by your physician.  This medicine may be used for other purposes; ask your health care provider or pharmacist if you have questions.  COMMON BRAND NAME(S): Chantix  What should I tell my health care provider before I take this medicine?  They need to know if you have any of these conditions:  -heart disease  -if you often drink alcohol  -kidney disease  -mental illness  -on hemodialysis  -seizures  -history of stroke  -suicidal thoughts, plans, or attempt; a previous suicide attempt by you or a family member  -an unusual or allergic reaction to varenicline, other medicines, foods, dyes, or preservatives  -pregnant or trying to get pregnant  -breast-feeding  How should I use this medicine?  Take this medicine by mouth after eating. Take with a full glass of  water. Follow the directions on the prescription label. Take your doses at regular intervals. Do not take your medicine more often than directed.  There are 3 ways you can use this medicine to help you quit smoking; talk to your health care professional to decide which plan is right for you:  1) you can choose a quit date and start this medicine 1 week before the quit date, or,  2) you can start taking this medicine before you choose a quit date, and then pick a quit date between day 8 and 35 days of treatment, or,  3) if you are not sure that you are able or willing to quit smoking right away, start taking this medicine and slowly decrease the amount you smoke as directed by your health care professional with the goal of being cigarette-free by week 12 of treatment.  Stick to your plan; ask about support groups or other ways to help you remain cigarette-free. If you are motivated to quit smoking and did not succeed during a previous attempt with this medicine for reasons other than side effects, or if you returned to smoking after this treatment, speak with your health care professional about whether another course of this medicine may be right for you.  A special MedGuide will be given to you by the pharmacist with each prescription and refill. Be sure to read this information carefully each time.  Talk to your pediatrician regarding the use of this medicine in children. This medicine is not approved for use in children.  Overdosage: If you think you have taken too much of this medicine contact a poison control center or emergency room at once.  NOTE: This medicine is only for you. Do not share this medicine with others.  What if I miss a dose?  If you miss a dose, take it as soon as you can. If it is almost time for your next dose, take only that dose. Do not take double or extra doses.  What may interact with this medicine?  -alcohol  -insulin  -other medicines used to help people quit  smoking  -theophylline  -warfarin  This list may not describe all possible interactions. Give your health care provider a list of all the medicines, herbs, non-prescription drugs, or dietary supplements you use. Also tell them if you smoke, drink alcohol, or use illegal drugs. Some items may interact with your medicine.  What should I watch for while using this medicine?  It is okay if you do not succeed at your attempt to quit and have a cigarette. You can still continue your quit attempt and keep using this medicine as directed. Just throw away your cigarettes and get back to your quit plan.  Talk to your health care provider before using other treatments to quit smoking. Using this medicine with other treatments to quit smoking may increase the risk for side effects compared to using a treatment alone.  You may get drowsy or dizzy. Do not drive, use machinery, or do anything that needs mental alertness until you know how this medicine affects you. Do not stand or sit up quickly, especially if you are an older patient. This reduces the risk of dizzy or fainting spells.  Decrease the amount of alcoholic beverages that you drink during treatment with this medicine until you know if this medicine affects your ability to tolerate alcohol. Some people have experienced increased drunkenness (intoxication), unusual or sometimes aggressive behavior, or no memory of things that have happened (amnesia) during treatment with this medicine.  Sleepwalking can happen during treatment with this medicine, and can sometimes lead to behavior that is harmful to you, other people, or property. Stop taking this medicine and tell your doctor if you start sleepwalking or have other unusual sleep-related activity.  Patients and their families should watch out for new or worsening depression or thoughts of suicide. Also watch out for sudden changes in feelings such as feeling anxious, agitated, panicky, irritable, hostile, aggressive,  impulsive, severely restless, overly excited and hyperactive, or not being able to sleep. If this happens, call your health care professional.  If you have diabetes and you quit smoking, the effects of insulin may be increased and you may need to reduce your insulin dose. Check with your doctor or health care professional about how you should adjust your insulin dose.  What side effects may I notice from receiving this medicine?  Side effects that you should report to your doctor or health care professional as soon as possible:  -allergic reactions like skin rash, itching or hives, swelling of the face, lips, tongue, or throat  -acting aggressive, being angry or violent, or acting on dangerous impulses  -breathing problems  -changes in emotions or moods  -chest pain or chest tightness  -feeling faint or lightheaded, falls  -hallucination, loss of contact with reality  -mouth sores  -redness, blistering, peeling or loosening of the skin, including inside the mouth  -signs and symptoms of a stroke like changes in vision; confusion; trouble speaking or understanding; severe headaches; sudden numbness or weakness of the face, arm or leg; trouble walking; dizziness; loss of balance or coordination  -seizures  -sleepwalking  -suicidal thoughts or other mood changes  Side effects that usually do not require medical attention (report to your doctor or health care professional if they continue or are bothersome):  -constipation  -gas  -headache  -nausea, vomiting  -strange dreams  -trouble sleeping  This list may not describe all possible side effects. Call your doctor for medical advice about side effects. You may report side effects to FDA at 9-434-FDA-0251.  Where should I keep my medicine?  Keep out of the reach of children.  Store at room temperature between 15 and 30 degrees C (59 and 86 degrees F). Throw away any unused medicine after the expiration date.  NOTE: This sheet is a summary. It may not cover all possible  information. If you have questions about this medicine, talk to your doctor, pharmacist, or health care provider.  © 2019 [employer] (2018-06-15 14:42:00)    Pharyngitis  Pharyngitis is redness, pain, and swelling (inflammation) of the throat (pharynx). It is a very common cause of sore throat. Pharyngitis can be caused by a bacteria, but it is usually caused by a virus. Most cases of pharyngitis get better on their own without treatment.  What are the causes?  This condition may be caused by:  · Infection by viruses (viral). Viral pharyngitis spreads from person to person (is contagious) through coughing, sneezing, and sharing of personal items or utensils such as cups, forks, spoons, and toothbrushes.  · Infection by bacteria (bacterial). Bacterial pharyngitis may be spread by touching the nose or face after coming in contact with the bacteria, or through more intimate contact, such as kissing.  · Allergies. Allergies can cause buildup of mucus in the throat (post-nasal drip), leading to inflammation and irritation. Allergies can also cause blocked nasal passages, forcing breathing through the mouth, which dries and irritates the throat.    What increases the risk?  You are more likely to develop this condition if:  · You are 5-24 years old.  · You are exposed to crowded environments such as , school, or dormitory living.  · You live in a cold climate.  · You have a weakened disease-fighting (immune) system.    What are the signs or symptoms?  Symptoms of this condition vary by the cause (viral, bacterial, or allergies) and can include:  · Sore throat.  · Fatigue.  · Low-grade fever.  · Headache.  · Joint pain and muscle aches.  · Skin rashes.  · Swollen glands in the throat (lymph nodes).  · Plaque-like film on the throat or tonsils. This is often a symptom of bacterial pharyngitis.  · Vomiting.  · Stuffy nose (nasal congestion).  · Cough.  · Red, itchy eyes (conjunctivitis).  · Loss of  appetite.    How is this diagnosed?  This condition is often diagnosed based on your medical history and a physical exam. Your health care provider will ask you questions about your illness and your symptoms. A swab of your throat may be done to check for bacteria (rapid strep test). Other lab tests may also be done, depending on the suspected cause, but these are rare.  How is this treated?  This condition usually gets better in 3-4 days without medicine. Bacterial pharyngitis may be treated with antibiotic medicines.  Follow these instructions at home:  · Take over-the-counter and prescription medicines only as told by your health care provider.  ? If you were prescribed an antibiotic medicine, take it as told by your health care provider. Do not stop taking the antibiotic even if you start to feel better.  ? Do not give children aspirin because of the association with Reye syndrome.  · Drink enough water and fluids to keep your urine clear or pale yellow.  · Get a lot of rest.  · Gargle with a salt-water mixture 3-4 times a day or as needed. To make a salt-water mixture, completely dissolve ½-1 tsp of salt in 1 cup of warm water.  · If your health care provider approves, you may use throat lozenges or sprays to soothe your throat.  Contact a health care provider if:  · You have large, tender lumps in your neck.  · You have a rash.  · You cough up green, yellow-brown, or bloody spit.  Get help right away if:  · Your neck becomes stiff.  · You drool or are unable to swallow liquids.  · You cannot drink or take medicines without vomiting.  · You have severe pain that does not go away, even after you take medicine.  · You have trouble breathing, and it is not caused by a stuffy nose.  · You have new pain and swelling in your joints such as the knees, ankles, wrists, or elbows.  Summary  · Pharyngitis is redness, pain, and swelling (inflammation) of the throat (pharynx).  · While pharyngitis can be caused by a  bacteria, the most common causes are viral.  · Most cases of pharyngitis get better on their own without treatment.  · Bacterial pharyngitis is treated with antibiotic medicines.  This information is not intended to replace advice given to you by your health care provider. Make sure you discuss any questions you have with your health care provider.  Document Released: 12/18/2006 Document Revised: 01/23/2018 Document Reviewed: 01/23/2018  [employer] Interactive Patient Education © 2019 [employer]    Steps to Quit Smoking  Smoking tobacco can be harmful to your health and can affect almost every organ in your body. Smoking puts you, and those around you, at risk for developing many serious chronic diseases. Quitting smoking is difficult, but it is one of the best things that you can do for your health. It is never too late to quit.  What are the benefits of quitting smoking?  When you quit smoking, you lower your risk of developing serious diseases and conditions, such as:  · Lung cancer or lung disease, such as COPD.  · Heart disease.  · Stroke.  · Heart attack.  · Infertility.  · Osteoporosis and bone fractures.    Additionally, symptoms such as coughing, wheezing, and shortness of breath may get better when you quit. You may also find that you get sick less often because your body is stronger at fighting off colds and infections. If you are pregnant, quitting smoking can help to reduce your chances of having a baby of low birth weight.  How do I get ready to quit?  When you decide to quit smoking, create a plan to make sure that you are successful. Before you quit:  · Pick a date to quit. Set a date within the next two weeks to give you time to prepare.  · Write down the reasons why you are quitting. Keep this list in places where you will see it often, such as on your bathroom mirror or in your car or wallet.  · Identify the people, places, things, and activities that make you want to smoke (triggers) and avoid  them. Make sure to take these actions:  ? Throw away all cigarettes at home, at work, and in your car.  ? Throw away smoking accessories, such as ashtrays and lighters.  ? Clean your car and make sure to empty the ashtray.  ? Clean your home, including curtains and carpets.  · Tell your family, friends, and coworkers that you are quitting. Support from your loved ones can make quitting easier.  · Talk with your health care provider about your options for quitting smoking.  · Find out what treatment options are covered by your health insurance.    What strategies can I use to quit smoking?  Talk with your healthcare provider about different strategies to quit smoking. Some strategies include:  · Quitting smoking altogether instead of gradually lessening how much you smoke over a period of time. Research shows that quitting “cold turkey” is more successful than gradually quitting.  · Attending in-person counseling to help you build problem-solving skills. You are more likely to have success in quitting if you attend several counseling sessions. Even short sessions of 10 minutes can be effective.  · Finding resources and support systems that can help you to quit smoking and remain smoke-free after you quit. These resources are most helpful when you use them often. They can include:  ? Online chats with a counselor.  ? Telephone quitlines.  ? Printed self-help materials.  ? Support groups or group counseling.  ? Text messaging programs.  ? Mobile phone applications.  · Taking medicines to help you quit smoking. (If you are pregnant or breastfeeding, talk with your health care provider first.) Some medicines contain nicotine and some do not. Both types of medicines help with cravings, but the medicines that include nicotine help to relieve withdrawal symptoms. Your health care provider may recommend:  ? Nicotine patches, gum, or lozenges.  ? Nicotine inhalers or sprays.  ? Non-nicotine medicine that is taken by  mouth.    Talk with your health care provider about combining strategies, such as taking medicines while you are also receiving in-person counseling. Using these two strategies together makes you more likely to succeed in quitting than if you used either strategy on its own.  If you are pregnant or breastfeeding, talk with your health care provider about finding counseling or other support strategies to quit smoking. Do not take medicine to help you quit smoking unless told to do so by your health care provider.  What things can I do to make it easier to quit?  Quitting smoking might feel overwhelming at first, but there is a lot that you can do to make it easier. Take these important actions:  · Reach out to your family and friends and ask that they support and encourage you during this time. Call telephone quitlines, reach out to support groups, or work with a counselor for support.  · Ask people who smoke to avoid smoking around you.  · Avoid places that trigger you to smoke, such as bars, parties, or smoke-break areas at work.  · Spend time around people who do not smoke.  · Lessen stress in your life, because stress can be a smoking trigger for some people. To lessen stress, try:  ? Exercising regularly.  ? Deep-breathing exercises.  ? Yoga.  ? Meditating.  ? Performing a body scan. This involves closing your eyes, scanning your body from head to toe, and noticing which parts of your body are particularly tense. Purposefully relax the muscles in those areas.  · Download or purchase mobile phone or tablet apps (applications) that can help you stick to your quit plan by providing reminders, tips, and encouragement. There are many free apps, such as QuitGuide from the CDC (Centers for Disease Control and Prevention). You can find other support for quitting smoking (smoking cessation) through smokefree.gov and other websites.    How will I feel when I quit smoking?  Within the first 24 hours of quitting smoking,  you may start to feel some withdrawal symptoms. These symptoms are usually most noticeable 2-3 days after quitting, but they usually do not last beyond 2-3 weeks. Changes or symptoms that you might experience include:  · Mood swings.  · Restlessness, anxiety, or irritation.  · Difficulty concentrating.  · Dizziness.  · Strong cravings for sugary foods in addition to nicotine.  · Mild weight gain.  · Constipation.  · Nausea.  · Coughing or a sore throat.  · Changes in how your medicines work in your body.  · A depressed mood.  · Difficulty sleeping (insomnia).    After the first 2-3 weeks of quitting, you may start to notice more positive results, such as:  · Improved sense of smell and taste.  · Decreased coughing and sore throat.  · Slower heart rate.  · Lower blood pressure.  · Clearer skin.  · The ability to breathe more easily.  · Fewer sick days.    Quitting smoking is very challenging for most people. Do not get discouraged if you are not successful the first time. Some people need to make many attempts to quit before they achieve long-term success. Do your best to stick to your quit plan, and talk with your health care provider if you have any questions or concerns.  This information is not intended to replace advice given to you by your health care provider. Make sure you discuss any questions you have with your health care provider.  Document Released: 12/12/2002 Document Revised: 07/24/2018 Document Reviewed: 05/03/2016  [employer] Interactive Patient Education © 2019 [employer]

## 2019-03-22 ENCOUNTER — LAB (OUTPATIENT)
Dept: LAB | Facility: HOSPITAL | Age: 29
End: 2019-03-22

## 2019-03-22 DIAGNOSIS — Z00.00 GENERAL MEDICAL EXAMINATION: ICD-10-CM

## 2019-03-22 LAB
25(OH)D3 SERPL-MCNC: 28.4 NG/ML (ref 30–100)
ALBUMIN SERPL-MCNC: 4.6 G/DL (ref 3.4–4.8)
ALBUMIN/GLOB SERPL: 1.4 G/DL (ref 1.1–1.8)
ALP SERPL-CCNC: 51 U/L (ref 38–126)
ALT SERPL W P-5'-P-CCNC: 14 U/L (ref 9–52)
ANION GAP SERPL CALCULATED.3IONS-SCNC: 12 MMOL/L (ref 5–15)
ANISOCYTOSIS BLD QL: ABNORMAL
ARTICHOKE IGE QN: 70 MG/DL (ref 1–129)
AST SERPL-CCNC: 18 U/L (ref 14–36)
BASOPHILS # BLD AUTO: 0.12 10*3/MM3 (ref 0–0.2)
BASOPHILS NFR BLD AUTO: 1.3 % (ref 0–1.5)
BILIRUB SERPL-MCNC: 0.4 MG/DL (ref 0.2–1.3)
BUN BLD-MCNC: 10 MG/DL (ref 7–21)
BUN/CREAT SERPL: 14.5 (ref 7–25)
CALCIUM SPEC-SCNC: 9.6 MG/DL (ref 8.4–10.2)
CHLORIDE SERPL-SCNC: 104 MMOL/L (ref 95–110)
CHOLEST SERPL-MCNC: 125 MG/DL (ref 0–199)
CO2 SERPL-SCNC: 21 MMOL/L (ref 22–31)
CREAT BLD-MCNC: 0.69 MG/DL (ref 0.5–1)
DEPRECATED RDW RBC AUTO: 37.8 FL (ref 37–54)
EOSINOPHIL # BLD AUTO: 0.54 10*3/MM3 (ref 0–0.4)
EOSINOPHIL # BLD MANUAL: 0.55 10*3/MM3 (ref 0–0.4)
EOSINOPHIL NFR BLD AUTO: 5.9 % (ref 0.3–6.2)
EOSINOPHIL NFR BLD MANUAL: 6 % (ref 0.3–6.2)
ERYTHROCYTE [DISTWIDTH] IN BLOOD BY AUTOMATED COUNT: 19.6 % (ref 12.3–15.4)
GFR SERPL CREATININE-BSD FRML MDRD: 123 ML/MIN/1.73 (ref 71–165)
GLOBULIN UR ELPH-MCNC: 3.3 GM/DL (ref 2.3–3.5)
GLUCOSE BLD-MCNC: 91 MG/DL (ref 60–100)
HBA1C MFR BLD: 5.1 % (ref 4–5.6)
HCT VFR BLD AUTO: 28.5 % (ref 34–46.6)
HDLC SERPL-MCNC: 45 MG/DL (ref 60–200)
HGB BLD-MCNC: 8.4 G/DL (ref 12–15.9)
IMM GRANULOCYTES # BLD AUTO: 0.02 10*3/MM3 (ref 0–0.05)
IMM GRANULOCYTES NFR BLD AUTO: 0.2 % (ref 0–0.5)
LDLC/HDLC SERPL: 1.57 {RATIO} (ref 0–3.22)
LYMPHOCYTES # BLD AUTO: 1.67 10*3/MM3 (ref 0.7–3.1)
LYMPHOCYTES # BLD MANUAL: 1.19 10*3/MM3 (ref 0.7–3.1)
LYMPHOCYTES NFR BLD AUTO: 18.2 % (ref 19.6–45.3)
LYMPHOCYTES NFR BLD MANUAL: 13 % (ref 19.6–45.3)
LYMPHOCYTES NFR BLD MANUAL: 5 % (ref 5–12)
MCH RBC QN AUTO: 17.1 PG (ref 26.6–33)
MCHC RBC AUTO-ENTMCNC: 29.5 G/DL (ref 31.5–35.7)
MCV RBC AUTO: 58.2 FL (ref 79–97)
MONOCYTES # BLD AUTO: 0.46 10*3/MM3 (ref 0.1–0.9)
MONOCYTES # BLD AUTO: 0.64 10*3/MM3 (ref 0.1–0.9)
MONOCYTES NFR BLD AUTO: 7 % (ref 5–12)
NEUTROPHILS # BLD AUTO: 6.17 10*3/MM3 (ref 1.4–7)
NEUTROPHILS # BLD AUTO: 6.96 10*3/MM3 (ref 1.4–7)
NEUTROPHILS NFR BLD AUTO: 67.4 % (ref 42.7–76)
NEUTROPHILS NFR BLD MANUAL: 76 % (ref 42.7–76)
NRBC BLD AUTO-RTO: 0 /100 WBC (ref 0–0)
PLATELET # BLD AUTO: 267 10*3/MM3 (ref 140–450)
PMV BLD AUTO: 10.1 FL (ref 6–12)
POTASSIUM BLD-SCNC: 4.2 MMOL/L (ref 3.5–5.1)
PROT SERPL-MCNC: 7.9 G/DL (ref 6.3–8.6)
RBC # BLD AUTO: 4.9 10*6/MM3 (ref 3.77–5.28)
SMALL PLATELETS BLD QL SMEAR: ADEQUATE
SODIUM BLD-SCNC: 137 MMOL/L (ref 137–145)
T4 FREE SERPL-MCNC: 0.91 NG/DL (ref 0.78–2.19)
TRIGL SERPL-MCNC: 46 MG/DL (ref 20–199)
TSH SERPL DL<=0.05 MIU/L-ACNC: 1.3 MIU/ML (ref 0.46–4.68)
WBC MORPH BLD: NORMAL
WBC NRBC COR # BLD: 9.16 10*3/MM3 (ref 3.4–10.8)

## 2019-03-22 PROCEDURE — 82306 VITAMIN D 25 HYDROXY: CPT

## 2019-03-22 PROCEDURE — 80053 COMPREHEN METABOLIC PANEL: CPT

## 2019-03-22 PROCEDURE — 84443 ASSAY THYROID STIM HORMONE: CPT

## 2019-03-22 PROCEDURE — 84481 FREE ASSAY (FT-3): CPT

## 2019-03-22 PROCEDURE — 80061 LIPID PANEL: CPT

## 2019-03-22 PROCEDURE — 85025 COMPLETE CBC W/AUTO DIFF WBC: CPT

## 2019-03-22 PROCEDURE — 84439 ASSAY OF FREE THYROXINE: CPT

## 2019-03-22 PROCEDURE — 83036 HEMOGLOBIN GLYCOSYLATED A1C: CPT

## 2019-03-23 LAB — T3FREE SERPL-MCNC: 2.9 PG/ML (ref 2–4.4)

## 2019-03-28 ENCOUNTER — TELEPHONE (OUTPATIENT)
Dept: FAMILY MEDICINE CLINIC | Facility: CLINIC | Age: 29
End: 2019-03-28

## 2019-03-28 RX ORDER — ERGOCALCIFEROL 1.25 MG/1
50000 CAPSULE ORAL WEEKLY
Qty: 4 CAPSULE | Refills: 3 | Status: CANCELLED | OUTPATIENT
Start: 2019-03-28

## 2019-03-28 RX ORDER — FERROUS SULFATE 325(65) MG
TABLET ORAL
Qty: 90 TABLET | Refills: 3 | Status: CANCELLED | OUTPATIENT
Start: 2019-03-28

## 2019-03-29 ENCOUNTER — TELEPHONE (OUTPATIENT)
Dept: FAMILY MEDICINE CLINIC | Facility: CLINIC | Age: 29
End: 2019-03-29

## 2019-03-29 RX ORDER — FERROUS SULFATE 325(65) MG
325 TABLET ORAL
Qty: 90 TABLET | Refills: 3 | Status: SHIPPED | OUTPATIENT
Start: 2019-03-29 | End: 2019-04-04 | Stop reason: SDUPTHER

## 2019-03-29 RX ORDER — ERGOCALCIFEROL 1.25 MG/1
50000 CAPSULE ORAL
Qty: 4 CAPSULE | Refills: 3 | Status: SHIPPED | OUTPATIENT
Start: 2019-03-29 | End: 2019-04-04 | Stop reason: SDUPTHER

## 2019-04-04 RX ORDER — FERROUS SULFATE 325(65) MG
325 TABLET ORAL
Qty: 90 TABLET | Refills: 3 | Status: SHIPPED | OUTPATIENT
Start: 2019-04-04 | End: 2020-04-07 | Stop reason: SDUPTHER

## 2019-04-04 RX ORDER — ERGOCALCIFEROL 1.25 MG/1
50000 CAPSULE ORAL
Qty: 4 CAPSULE | Refills: 3 | Status: SHIPPED | OUTPATIENT
Start: 2019-04-04 | End: 2020-01-06 | Stop reason: SDUPTHER

## 2019-04-20 PROBLEM — E55.9 VITAMIN D DEFICIENCY: Status: ACTIVE | Noted: 2019-04-20

## 2019-04-20 PROBLEM — D50.9 IRON DEFICIENCY ANEMIA: Status: ACTIVE | Noted: 2019-04-20

## 2019-04-20 PROBLEM — R53.83 OTHER FATIGUE: Status: ACTIVE | Noted: 2019-04-20

## 2019-04-20 PROBLEM — E78.5 DYSLIPIDEMIA: Status: ACTIVE | Noted: 2019-04-20

## 2020-01-01 NOTE — PROGRESS NOTES
Subjective:  Sarthak Yanez is a 29 y.o. female who presents for       Patient Active Problem List   Diagnosis   • Encounter for screening for endocrine disorder   • Encounter for screening for cardiovascular disorders   • Nonintractable headache   • Encounter for screening for diabetes mellitus   • Tobacco abuse counseling   • Tobacco user   • Encounter for Papanicolaou smear of cervix   • Acute pharyngitis   • Iron deficiency anemia   • Vitamin D deficiency   • Other fatigue   • Dyslipidemia           Current Outpatient Medications:   •  ferrous sulfate 325 (65 FE) MG tablet, Take 1 tablet by mouth 3 (Three) Times a Day With Meals., Disp: 90 tablet, Rfl: 3  •  vitamin D (ERGOCALCIFEROL) 1.25 MG (12925 UT) capsule capsule, Take 1 capsule by mouth Every 7 (Seven) Days., Disp: 4 capsule, Rfl: 3  •  cyclobenzaprine (FLEXERIL) 5 MG tablet, Take 1 tablet by mouth At Night As Needed for Muscle Spasms., Disp: 30 tablet, Rfl: 3  •  nicotine (NICODERM CQ) 21 MG/24HR patch, Place 1 patch on the skin as directed by provider Daily., Disp: 30 patch, Rfl: 3    Pt is 28 yo female with management of tobacco use, Vitamin D deficiency, iron deficiency anemia     3/20/19 pt is here for recheck and followup on tobacco use and headaches. SHe is due for new labwork. She is due for pap smear. She is overall doing well. Her only issue is sore throat for past 2 days. Denies any fever. 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     1/6/20 pt is here for recheck and followup.  She states today her lower back has been hurting for a month. She picked up her daughter at home and it has been hurting since then. She has not sought treatment until today. Pain I 8/10 on severity.  She has tried ibuprofen with no relief. No PT/OT.  She also states pain radiates from lower back down back of both legs.  No issues with bowel or bladder problems. Shecontinues to smoke tobacco. She did not get chantix filled due to cost. She is currently  not pregnant. Her last menstrual period was last month. She works 2 jobs at [employer] and at [employer]     Leg Pain    The incident occurred more than 1 week ago. The incident occurred at home. The injury mechanism is unknown. The pain is present in the right leg and left leg. The pain is at a severity of 4/10. The pain has been constant since onset. Associated symptoms include a loss of motion, muscle weakness, numbness and tingling. Pertinent negatives include no inability to bear weight. The symptoms are aggravated by movement and palpation. She has tried nothing for the symptoms. The treatment provided no relief.   Back Pain   This is a recurrent problem. The current episode started more than 1 month ago. The problem is unchanged. The pain is present in the gluteal and lumbar spine. The quality of the pain is described as aching. The pain does not radiate. The pain is at a severity of 4/10. The pain is moderate. The pain is the same all the time. The symptoms are aggravated by bending, lying down and position. Stiffness is present all day. Associated symptoms include leg pain, numbness, tingling and weakness. Pertinent negatives include no abdominal pain, bladder incontinence, bowel incontinence, chest pain, dysuria, fever, headaches, paresis, paresthesias, pelvic pain, perianal numbness or weight loss. She has tried nothing for the symptoms. The treatment provided no relief.          Review of Systems  Review of Systems   Constitutional: Positive for activity change and fatigue. Negative for appetite change, chills, diaphoresis, fever and weight loss.   HENT: Negative for congestion, postnasal drip, rhinorrhea, sinus pressure, sinus pain, sneezing, sore throat, trouble swallowing and voice change.    Respiratory: Negative for cough, choking, chest tightness, shortness of breath, wheezing and stridor.    Cardiovascular: Negative for chest pain.   Gastrointestinal: Negative for abdominal pain,  bowel incontinence, diarrhea, nausea and vomiting.   Genitourinary: Negative for bladder incontinence, dysuria and pelvic pain.   Musculoskeletal: Positive for arthralgias, back pain, gait problem and joint swelling.   Neurological: Positive for tingling, weakness and numbness. Negative for headaches and paresthesias.       Patient Active Problem List   Diagnosis   • Encounter for screening for endocrine disorder   • Encounter for screening for cardiovascular disorders   • Nonintractable headache   • Encounter for screening for diabetes mellitus   • Tobacco abuse counseling   • Tobacco user   • Encounter for Papanicolaou smear of cervix   • Acute pharyngitis   • Iron deficiency anemia   • Vitamin D deficiency   • Other fatigue   • Dyslipidemia     No past surgical history on file.  Social History     Socioeconomic History   • Marital status:      Spouse name: Not on file   • Number of children: Not on file   • Years of education: Not on file   • Highest education level: Not on file   Tobacco Use   • Smoking status: Current Every Day Smoker   • Smokeless tobacco: Never Used   Substance and Sexual Activity   • Alcohol use: No     Family History   Problem Relation Age of Onset   • Asthma Brother    • Hypertension Maternal Aunt    • Alcohol abuse Maternal Uncle    • Cancer Maternal Uncle    • Diabetes Maternal Grandmother      No visits with results within 6 Month(s) from this visit.   Latest known visit with results is:   Lab on 03/22/2019   Component Date Value Ref Range Status   • WBC 03/22/2019 9.16  3.40 - 10.80 10*3/mm3 Final   • RBC 03/22/2019 4.90  3.77 - 5.28 10*6/mm3 Final   • Hemoglobin 03/22/2019 8.4* 12.0 - 15.9 g/dL Final   • Hematocrit 03/22/2019 28.5* 34.0 - 46.6 % Final   • MCV 03/22/2019 58.2* 79.0 - 97.0 fL Final   • MCH 03/22/2019 17.1* 26.6 - 33.0 pg Final   • MCHC 03/22/2019 29.5* 31.5 - 35.7 g/dL Final   • RDW 03/22/2019 19.6* 12.3 - 15.4 % Final   • RDW-SD 03/22/2019 37.8  37.0 - 54.0 fl  Final   • MPV 03/22/2019 10.1  6.0 - 12.0 fL Final   • Platelets 03/22/2019 267  140 - 450 10*3/mm3 Final   • Neutrophil % 03/22/2019 67.4  42.7 - 76.0 % Final   • Lymphocyte % 03/22/2019 18.2* 19.6 - 45.3 % Final   • Monocyte % 03/22/2019 7.0  5.0 - 12.0 % Final   • Eosinophil % 03/22/2019 5.9  0.3 - 6.2 % Final   • Basophil % 03/22/2019 1.3  0.0 - 1.5 % Final   • Immature Grans % 03/22/2019 0.2  0.0 - 0.5 % Final   • Neutrophils, Absolute 03/22/2019 6.17  1.40 - 7.00 10*3/mm3 Final   • Lymphocytes, Absolute 03/22/2019 1.67  0.70 - 3.10 10*3/mm3 Final   • Monocytes, Absolute 03/22/2019 0.64  0.10 - 0.90 10*3/mm3 Final   • Eosinophils, Absolute 03/22/2019 0.54* 0.00 - 0.40 10*3/mm3 Final   • Basophils, Absolute 03/22/2019 0.12  0.00 - 0.20 10*3/mm3 Final   • Immature Grans, Absolute 03/22/2019 0.02  0.00 - 0.05 10*3/mm3 Final   • nRBC 03/22/2019 0.0  0.0 - 0.0 /100 WBC Final   • Glucose 03/22/2019 91  60 - 100 mg/dL Final   • BUN 03/22/2019 10  7 - 21 mg/dL Final   • Creatinine 03/22/2019 0.69  0.50 - 1.00 mg/dL Final   • Sodium 03/22/2019 137  137 - 145 mmol/L Final   • Potassium 03/22/2019 4.2  3.5 - 5.1 mmol/L Final   • Chloride 03/22/2019 104  95 - 110 mmol/L Final   • CO2 03/22/2019 21.0* 22.0 - 31.0 mmol/L Final   • Calcium 03/22/2019 9.6  8.4 - 10.2 mg/dL Final   • Total Protein 03/22/2019 7.9  6.3 - 8.6 g/dL Final   • Albumin 03/22/2019 4.60  3.40 - 4.80 g/dL Final   • ALT (SGPT) 03/22/2019 14  9 - 52 U/L Final   • AST (SGOT) 03/22/2019 18  14 - 36 U/L Final   • Alkaline Phosphatase 03/22/2019 51  38 - 126 U/L Final   • Total Bilirubin 03/22/2019 0.4  0.2 - 1.3 mg/dL Final   • eGFR   Amer 03/22/2019 123  71 - 165 mL/min/1.73 Final   • Globulin 03/22/2019 3.3  2.3 - 3.5 gm/dL Final   • A/G Ratio 03/22/2019 1.4  1.1 - 1.8 g/dL Final   • BUN/Creatinine Ratio 03/22/2019 14.5  7.0 - 25.0 Final   • Anion Gap 03/22/2019 12.0  5.0 - 15.0 mmol/L Final   • Hemoglobin A1C 03/22/2019 5.1  4 - 5.6 % Final   •  "Total Cholesterol 03/22/2019 125  0 - 199 mg/dL Final   • Triglycerides 03/22/2019 46  20 - 199 mg/dL Final   • HDL Cholesterol 03/22/2019 45* 60 - 200 mg/dL Final   • LDL Cholesterol  03/22/2019 70  1 - 129 mg/dL Final   • LDL/HDL Ratio 03/22/2019 1.57  0.00 - 3.22 Final   • TSH 03/22/2019 1.300  0.460 - 4.680 mIU/mL Final   • Free T4 03/22/2019 0.91  0.78 - 2.19 ng/dL Final   • T3, Free 03/22/2019 2.9  2.0 - 4.4 pg/mL Final   • 25 Hydroxy, Vitamin D 03/22/2019 28.4* 30.0 - 100.0 ng/ml Final   • Neutrophil % 03/22/2019 76.0  42.7 - 76.0 % Final   • Lymphocyte % 03/22/2019 13.0* 19.6 - 45.3 % Final   • Monocyte % 03/22/2019 5.0  5.0 - 12.0 % Final   • Eosinophil % 03/22/2019 6.0  0.3 - 6.2 % Final   • Neutrophils Absolute 03/22/2019 6.96  1.40 - 7.00 10*3/mm3 Final   • Lymphocytes Absolute 03/22/2019 1.19  0.70 - 3.10 10*3/mm3 Final   • Monocytes Absolute 03/22/2019 0.46  0.10 - 0.90 10*3/mm3 Final   • Eosinophils Absolute 03/22/2019 0.55* 0.00 - 0.40 10*3/mm3 Final   • Anisocytosis 03/22/2019 Mod/2+  None Seen Final   • WBC Morphology 03/22/2019 Normal  Normal Final   • Platelet Estimate 03/22/2019 Adequate  Normal Final      No image results found.    [unfilled]  Immunization History   Administered Date(s) Administered   • Pneumococcal Polysaccharide (PPSV23) 10/05/2017   • Tdap 10/05/2017       The following portions of the patient's history were reviewed and updated as appropriate: allergies, current medications, past family history, past medical history, past social history, past surgical history and problem list.        Physical Exam  /56 (BP Location: Right arm, Patient Position: Sitting, Cuff Size: Adult)   Pulse 91   Temp 98.5 °F (36.9 °C) (Oral)   Ht 170.2 cm (67\")   Wt 72.5 kg (159 lb 12.8 oz)   LMP 12/28/2019   SpO2 99%   BMI 25.03 kg/m²     Physical Exam   Constitutional: She is oriented to person, place, and time. She appears well-developed and well-nourished.   HENT:   Head: " Normocephalic and atraumatic.   Right Ear: External ear normal.   Eyes: Pupils are equal, round, and reactive to light. Conjunctivae and EOM are normal.   Neck: Normal range of motion. Neck supple.   Cardiovascular: Normal rate, regular rhythm and normal heart sounds.   No murmur heard.  Pulmonary/Chest: Effort normal and breath sounds normal. No respiratory distress.   Abdominal: Soft. Bowel sounds are normal. She exhibits no distension. There is no tenderness.   Musculoskeletal: She exhibits tenderness. She exhibits no edema or deformity.        Lumbar back: She exhibits decreased range of motion, tenderness, bony tenderness, pain and spasm.   Neurological: She is alert and oriented to person, place, and time. No cranial nerve deficit.   Skin: Skin is warm. No rash noted. She is not diaphoretic. No erythema. No pallor.   Psychiatric: She has a normal mood and affect. Her behavior is normal.   Nursing note and vitals reviewed.      Assessment/Plan    Diagnosis Plan   1. Acute bilateral low back pain with bilateral sciatica  Ambulatory Referral to Physical Therapy Evaluate and treat   2. Tobacco abuse counseling     3. Tobacco user     4. Vitamin D deficiency     5. Iron deficiency anemia, unspecified iron deficiency anemia type  Iron Profile    Ferritin   6. Dyslipidemia  CBC Auto Differential    Comprehensive Metabolic Panel    Lipid Panel    Vitamin D 25 Hydroxy   7. General medical examination  CBC Auto Differential    Comprehensive Metabolic Panel    Lipid Panel    Vitamin D 25 Hydroxy    Hemoglobin A1c   8. Acute bilateral back pain, unspecified back location  XR Spine Lumbar Complete 4+VW    Ambulatory Referral to Physical Therapy Evaluate and treat           -recommend labwork  -recommend influenza vaccination   -leg pain/back pain -x-ray of lumbar spine. Recommend PT/OT. Start on mobic 15 mg daily. Gave drug information. Flexeril 5 mg at bedtime PRN   - tobacco user - nicotine patches reordered. Recommended  1 800 QUIT NOW. Counseled >5 minutes to quit. Will try chantix starting pack and maintenance pack   -advised pt to be safe and call with questions and concerns  -advised pt to go to ER or call 911 if symptoms worrisome or severe  -advised pt to followup with specialist and referrals         This document has been electronically signed by Alvaro Vogel MD on January 6, 2020 11:50 AM

## 2020-01-06 ENCOUNTER — TELEPHONE (OUTPATIENT)
Dept: FAMILY MEDICINE CLINIC | Facility: CLINIC | Age: 30
End: 2020-01-06

## 2020-01-06 ENCOUNTER — OFFICE VISIT (OUTPATIENT)
Dept: FAMILY MEDICINE CLINIC | Facility: CLINIC | Age: 30
End: 2020-01-06

## 2020-01-06 VITALS
SYSTOLIC BLOOD PRESSURE: 108 MMHG | HEART RATE: 91 BPM | WEIGHT: 159.8 LBS | HEIGHT: 67 IN | TEMPERATURE: 98.5 F | BODY MASS INDEX: 25.08 KG/M2 | DIASTOLIC BLOOD PRESSURE: 56 MMHG | OXYGEN SATURATION: 99 %

## 2020-01-06 DIAGNOSIS — Z00.00 GENERAL MEDICAL EXAMINATION: ICD-10-CM

## 2020-01-06 DIAGNOSIS — M54.9 ACUTE BILATERAL BACK PAIN, UNSPECIFIED BACK LOCATION: ICD-10-CM

## 2020-01-06 DIAGNOSIS — Z71.6 TOBACCO ABUSE COUNSELING: ICD-10-CM

## 2020-01-06 DIAGNOSIS — D50.9 IRON DEFICIENCY ANEMIA, UNSPECIFIED IRON DEFICIENCY ANEMIA TYPE: ICD-10-CM

## 2020-01-06 DIAGNOSIS — M54.42 ACUTE BILATERAL LOW BACK PAIN WITH BILATERAL SCIATICA: Primary | ICD-10-CM

## 2020-01-06 DIAGNOSIS — E55.9 VITAMIN D DEFICIENCY: ICD-10-CM

## 2020-01-06 DIAGNOSIS — M54.41 ACUTE BILATERAL LOW BACK PAIN WITH BILATERAL SCIATICA: Primary | ICD-10-CM

## 2020-01-06 DIAGNOSIS — Z72.0 TOBACCO USER: ICD-10-CM

## 2020-01-06 DIAGNOSIS — E78.5 DYSLIPIDEMIA: ICD-10-CM

## 2020-01-06 PROCEDURE — 99214 OFFICE O/P EST MOD 30 MIN: CPT | Performed by: FAMILY MEDICINE

## 2020-01-06 RX ORDER — NICOTINE 21 MG/24HR
1 PATCH, TRANSDERMAL 24 HOURS TRANSDERMAL EVERY 24 HOURS
Qty: 30 PATCH | Refills: 3 | Status: SHIPPED | OUTPATIENT
Start: 2020-01-06 | End: 2020-08-25

## 2020-01-06 RX ORDER — ERGOCALCIFEROL 1.25 MG/1
50000 CAPSULE ORAL
Qty: 4 CAPSULE | Refills: 3 | Status: SHIPPED | OUTPATIENT
Start: 2020-01-06 | End: 2020-08-25

## 2020-01-06 RX ORDER — CYCLOBENZAPRINE HCL 5 MG
5 TABLET ORAL NIGHTLY PRN
Qty: 30 TABLET | Refills: 3 | Status: SHIPPED | OUTPATIENT
Start: 2020-01-06 | End: 2020-08-25

## 2020-01-06 NOTE — TELEPHONE ENCOUNTER
----- Message from Alvaro Vogel MD sent at 1/6/2020  3:52 PM CST -----  Please call pt    X-ray of lower back normal. Proceed with PT/OT  mobic for pain    Recheck on next visit Thanks

## 2020-01-06 NOTE — PATIENT INSTRUCTIONS
Cyclobenzaprine tablets  What is this medicine?  CYCLOBENZAPRINE (sye kloe JANAE za preen) is a muscle relaxer. It is used to treat muscle pain, spasms, and stiffness.  This medicine may be used for other purposes; ask your health care provider or pharmacist if you have questions.  COMMON BRAND NAME(S): Fexmid, Flexeril  What should I tell my health care provider before I take this medicine?  They need to know if you have any of these conditions:  -heart disease, irregular heartbeat, or previous heart attack  -liver disease  -thyroid problem  -an unusual or allergic reaction to cyclobenzaprine, tricyclic antidepressants, lactose, other medicines, foods, dyes, or preservatives  -pregnant or trying to get pregnant  -breast-feeding  How should I use this medicine?  Take this medicine by mouth with a glass of water. Follow the directions on the prescription label. If this medicine upsets your stomach, take it with food or milk. Take your medicine at regular intervals. Do not take it more often than directed.  Talk to your pediatrician regarding the use of this medicine in children. Special care may be needed.  Overdosage: If you think you have taken too much of this medicine contact a poison control center or emergency room at once.  NOTE: This medicine is only for you. Do not share this medicine with others.  What if I miss a dose?  If you miss a dose, take it as soon as you can. If it is almost time for your next dose, take only that dose. Do not take double or extra doses.  What may interact with this medicine?  Do not take this medicine with any of the following medications:  -MAOIs like Carbex, Eldepryl, Marplan, Nardil, and Parnate  This medicine may also interact with the following medications:  -alcohol  -antihistamines for allergy, cough, and cold  -certain medicines for anxiety or sleep  -certain medicines for depression like amitriptyline, fluoxetine, sertraline  -certain medicines for seizures like  phenobarbital, primidone  -contrast dyes  -local anesthetics like lidocaine, pramoxine, tetracaine  -medicines that relax muscles for surgery  -narcotic medicines for pain  -phenothiazines like chlorpromazine, mesoridazine, prochlorperazine  This list may not describe all possible interactions. Give your health care provider a list of all the medicines, herbs, non-prescription drugs, or dietary supplements you use. Also tell them if you smoke, drink alcohol, or use illegal drugs. Some items may interact with your medicine.  What should I watch for while using this medicine?  Tell your doctor or health care professional if your symptoms do not start to get better or if they get worse.  You may get drowsy or dizzy. Do not drive, use machinery, or do anything that needs mental alertness until you know how this medicine affects you. Do not stand or sit up quickly, especially if you are an older patient. This reduces the risk of dizzy or fainting spells. Alcohol may interfere with the effect of this medicine. Avoid alcoholic drinks.  If you are taking another medicine that also causes drowsiness, you may have more side effects. Give your health care provider a list of all medicines you use. Your doctor will tell you how much medicine to take. Do not take more medicine than directed. Call emergency for help if you have problems breathing or unusual sleepiness.  Your mouth may get dry. Chewing sugarless gum or sucking hard candy, and drinking plenty of water may help. Contact your doctor if the problem does not go away or is severe.  What side effects may I notice from receiving this medicine?  Side effects that you should report to your doctor or health care professional as soon as possible:  -allergic reactions like skin rash, itching or hives, swelling of the face, lips, or tongue  -breathing problems  -chest pain  -fast, irregular heartbeat  -hallucinations  -seizures  -unusually weak or tired  Side effects that  usually do not require medical attention (report to your doctor or health care professional if they continue or are bothersome):  -headache  -nausea, vomiting  This list may not describe all possible side effects. Call your doctor for medical advice about side effects. You may report side effects to FDA at 5-135-FDA-8156.  Where should I keep my medicine?  Keep out of the reach of children.  Store at room temperature between 15 and 30 degrees C (59 and 86 degrees F). Keep container tightly closed. Throw away any unused medicine after the expiration date.  NOTE: This sheet is a summary. It may not cover all possible information. If you have questions about this medicine, talk to your doctor, pharmacist, or health care provider.  © 2019 [employer]Gold Standard (2018-10-10 13:04:35)  Meloxicam tablets  What is this medicine?  MELOXICAM (tyrel OX i cam) is a non-steroidal anti-inflammatory drug (NSAID). It is used to reduce swelling and to treat pain. It may be used for osteoarthritis, rheumatoid arthritis, or juvenile rheumatoid arthritis.  This medicine may be used for other purposes; ask your health care provider or pharmacist if you have questions.  COMMON BRAND NAME(S): Serafin  What should I tell my health care provider before I take this medicine?  They need to know if you have any of these conditions:  -bleeding disorders  -cigarette smoker  -coronary artery bypass graft (CABG) surgery within the past 2 weeks  -drink more than 3 alcohol-containing drinks per day  -heart disease  -high blood pressure  -history of stomach bleeding  -kidney disease  -liver disease  -lung or breathing disease, like asthma  -stomach or intestine problems  -an unusual or allergic reaction to meloxicam, aspirin, other NSAIDs, other medicines, foods, dyes, or preservatives  -pregnant or trying to get pregnant  -breast-feeding  How should I use this medicine?  Take this medicine by mouth with a full glass of water. Follow the directions on  the prescription label. You can take it with or without food. If it upsets your stomach, take it with food. Take your medicine at regular intervals. Do not take it more often than directed. Do not stop taking except on your doctor's advice.  A special MedGuide will be given to you by the pharmacist with each prescription and refill. Be sure to read this information carefully each time.  Talk to your pediatrician regarding the use of this medicine in children. While this drug may be prescribed for selected conditions, precautions do apply.  Patients over 65 years old may have a stronger reaction and need a smaller dose.  Overdosage: If you think you have taken too much of this medicine contact a poison control center or emergency room at once.  NOTE: This medicine is only for you. Do not share this medicine with others.  What if I miss a dose?  If you miss a dose, take it as soon as you can. If it is almost time for your next dose, take only that dose. Do not take double or extra doses.  What may interact with this medicine?  Do not take this medicine with any of the following medications:  -cidofovir  -ketorolac  This medicine may also interact with the following medications:  -aspirin and aspirin-like medicines  -certain medicines for blood pressure, heart disease, irregular heart beat  -certain medicines for depression, anxiety, or psychotic disturbances  -certain medicines that treat or prevent blood clots like warfarin, enoxaparin, dalteparin, apixaban, dabigatran, rivaroxaban  -cyclosporine  -diuretics  -methotrexate  -other NSAIDs, medicines for pain and inflammation, like ibuprofen and naproxen  -pemetrexed  This list may not describe all possible interactions. Give your health care provider a list of all the medicines, herbs, non-prescription drugs, or dietary supplements you use. Also tell them if you smoke, drink alcohol, or use illegal drugs. Some items may interact with your medicine.  What should I  watch for while using this medicine?  Tell your doctor or healthcare professional if your symptoms do not start to get better or if they get worse.  Do not take other medicines that contain aspirin, ibuprofen, or naproxen with this medicine. Side effects such as stomach upset, nausea, or ulcers may be more likely to occur. Many medicines available without a prescription should not be taken with this medicine.  This medicine can cause ulcers and bleeding in the stomach and intestines at any time during treatment. This can happen with no warning and may cause death. There is increased risk with taking this medicine for a long time. Smoking, drinking alcohol, older age, and poor health can also increase risks. Call your doctor right away if you have stomach pain or blood in your vomit or stool.  This medicine does not prevent heart attack or stroke. In fact, this medicine may increase the chance of a heart attack or stroke. The chance may increase with longer use of this medicine and in people who have heart disease. If you take aspirin to prevent heart attack or stroke, talk with your doctor or health care professional.  What side effects may I notice from receiving this medicine?  Side effects that you should report to your doctor or health care professional as soon as possible:  -allergic reactions like skin rash, itching or hives, swelling of the face, lips, or tongue  -nausea, vomiting  -signs and symptoms of a blood clot such as breathing problems; changes in vision; chest pain; severe, sudden headache; pain, swelling, warmth in the leg; trouble speaking; sudden numbness or weakness of the face, arm, or leg  -signs and symptoms of bleeding such as bloody or black, tarry stools; red or dark-brown urine; spitting up blood or brown material that looks like coffee grounds; red spots on the skin; unusual bruising or bleeding from the eye, gums, or nose  -signs and symptoms of liver injury like dark yellow or brown  urine; general ill feeling or flu-like symptoms; light-colored stools; loss of appetite; nausea; right upper belly pain; unusually weak or tired; yellowing of the eyes or skin  -signs and symptoms of stroke like changes in vision; confusion; trouble speaking or understanding; severe headaches; sudden numbness or weakness of the face, arm, or leg; trouble walking; dizziness; loss of balance or coordination  Side effects that usually do not require medical attention (report to your doctor or health care professional if they continue or are bothersome):  -constipation  -diarrhea  -gas  This list may not describe all possible side effects. Call your doctor for medical advice about side effects. You may report side effects to FDA at 1-953-ZKZ-0572.  Where should I keep my medicine?  Keep out of the reach of children.  Store at room temperature between 15 and 30 degrees C (59 and 86 degrees F). Throw away any unused medicine after the expiration date.  NOTE: This sheet is a summary. It may not cover all possible information. If you have questions about this medicine, talk to your doctor, pharmacist, or health care provider.  © 2019 [employer]/Gold Standard (2019-04-19 11:22:56)

## 2020-01-30 ENCOUNTER — LAB (OUTPATIENT)
Dept: LAB | Facility: HOSPITAL | Age: 30
End: 2020-01-30

## 2020-01-30 DIAGNOSIS — E78.5 DYSLIPIDEMIA: ICD-10-CM

## 2020-01-30 DIAGNOSIS — Z00.00 GENERAL MEDICAL EXAMINATION: ICD-10-CM

## 2020-01-30 DIAGNOSIS — D50.9 IRON DEFICIENCY ANEMIA, UNSPECIFIED IRON DEFICIENCY ANEMIA TYPE: ICD-10-CM

## 2020-01-30 LAB
25(OH)D3 SERPL-MCNC: 21.5 NG/ML (ref 30–100)
ALBUMIN SERPL-MCNC: 4.1 G/DL (ref 3.5–5.2)
ALBUMIN/GLOB SERPL: 1.5 G/DL
ALP SERPL-CCNC: 46 U/L (ref 39–117)
ALT SERPL W P-5'-P-CCNC: 10 U/L (ref 1–33)
ANION GAP SERPL CALCULATED.3IONS-SCNC: 11.1 MMOL/L (ref 5–15)
AST SERPL-CCNC: 15 U/L (ref 1–32)
BASOPHILS # BLD AUTO: 0.08 10*3/MM3 (ref 0–0.2)
BASOPHILS NFR BLD AUTO: 0.6 % (ref 0–1.5)
BILIRUB SERPL-MCNC: 0.3 MG/DL (ref 0.2–1.2)
BUN BLD-MCNC: 8 MG/DL (ref 6–20)
BUN/CREAT SERPL: 11.3 (ref 7–25)
CALCIUM SPEC-SCNC: 8.9 MG/DL (ref 8.6–10.5)
CHLORIDE SERPL-SCNC: 105 MMOL/L (ref 98–107)
CHOLEST SERPL-MCNC: 119 MG/DL (ref 0–200)
CO2 SERPL-SCNC: 23.9 MMOL/L (ref 22–29)
CREAT BLD-MCNC: 0.71 MG/DL (ref 0.57–1)
DEPRECATED RDW RBC AUTO: 43.4 FL (ref 37–54)
EOSINOPHIL # BLD AUTO: 0.63 10*3/MM3 (ref 0–0.4)
EOSINOPHIL NFR BLD AUTO: 4.6 % (ref 0.3–6.2)
ERYTHROCYTE [DISTWIDTH] IN BLOOD BY AUTOMATED COUNT: 14.1 % (ref 12.3–15.4)
FERRITIN SERPL-MCNC: 26.1 NG/ML (ref 13–150)
GFR SERPL CREATININE-BSD FRML MDRD: 118 ML/MIN/1.73
GLOBULIN UR ELPH-MCNC: 2.8 GM/DL
GLUCOSE BLD-MCNC: 84 MG/DL (ref 65–99)
HBA1C MFR BLD: 4.83 % (ref 4.8–5.6)
HCT VFR BLD AUTO: 39.2 % (ref 34–46.6)
HDLC SERPL-MCNC: 43 MG/DL (ref 40–60)
HGB BLD-MCNC: 13.1 G/DL (ref 12–15.9)
IMM GRANULOCYTES # BLD AUTO: 0.03 10*3/MM3 (ref 0–0.05)
IMM GRANULOCYTES NFR BLD AUTO: 0.2 % (ref 0–0.5)
IRON 24H UR-MRATE: 42 MCG/DL (ref 37–145)
IRON SATN MFR SERPL: 11 % (ref 20–50)
LDLC SERPL CALC-MCNC: 67 MG/DL (ref 0–100)
LDLC/HDLC SERPL: 1.56 {RATIO}
LYMPHOCYTES # BLD AUTO: 1.57 10*3/MM3 (ref 0.7–3.1)
LYMPHOCYTES NFR BLD AUTO: 11.5 % (ref 19.6–45.3)
MCH RBC QN AUTO: 28.1 PG (ref 26.6–33)
MCHC RBC AUTO-ENTMCNC: 33.4 G/DL (ref 31.5–35.7)
MCV RBC AUTO: 83.9 FL (ref 79–97)
MONOCYTES # BLD AUTO: 0.72 10*3/MM3 (ref 0.1–0.9)
MONOCYTES NFR BLD AUTO: 5.3 % (ref 5–12)
NEUTROPHILS # BLD AUTO: 10.67 10*3/MM3 (ref 1.7–7)
NEUTROPHILS NFR BLD AUTO: 77.8 % (ref 42.7–76)
NRBC BLD AUTO-RTO: 0 /100 WBC (ref 0–0.2)
PLATELET # BLD AUTO: 364 10*3/MM3 (ref 140–450)
PMV BLD AUTO: 11.4 FL (ref 6–12)
POTASSIUM BLD-SCNC: 3.7 MMOL/L (ref 3.5–5.2)
PROT SERPL-MCNC: 6.9 G/DL (ref 6–8.5)
RBC # BLD AUTO: 4.67 10*6/MM3 (ref 3.77–5.28)
SODIUM BLD-SCNC: 140 MMOL/L (ref 136–145)
TIBC SERPL-MCNC: 393 MCG/DL (ref 298–536)
TRANSFERRIN SERPL-MCNC: 264 MG/DL (ref 200–360)
TRIGL SERPL-MCNC: 44 MG/DL (ref 0–150)
VLDLC SERPL-MCNC: 8.8 MG/DL (ref 5–40)
WBC NRBC COR # BLD: 13.7 10*3/MM3 (ref 3.4–10.8)

## 2020-01-30 PROCEDURE — 82306 VITAMIN D 25 HYDROXY: CPT

## 2020-01-30 PROCEDURE — 82728 ASSAY OF FERRITIN: CPT

## 2020-01-30 PROCEDURE — 80053 COMPREHEN METABOLIC PANEL: CPT

## 2020-01-30 PROCEDURE — 85025 COMPLETE CBC W/AUTO DIFF WBC: CPT

## 2020-01-30 PROCEDURE — 80061 LIPID PANEL: CPT

## 2020-01-30 PROCEDURE — 83036 HEMOGLOBIN GLYCOSYLATED A1C: CPT

## 2020-01-30 PROCEDURE — 83540 ASSAY OF IRON: CPT

## 2020-01-30 PROCEDURE — 84466 ASSAY OF TRANSFERRIN: CPT

## 2020-02-04 ENCOUNTER — TELEPHONE (OUTPATIENT)
Dept: FAMILY MEDICINE CLINIC | Facility: CLINIC | Age: 30
End: 2020-02-04

## 2020-02-04 DIAGNOSIS — D50.9 IRON DEFICIENCY ANEMIA, UNSPECIFIED IRON DEFICIENCY ANEMIA TYPE: Primary | ICD-10-CM

## 2020-03-01 NOTE — PROGRESS NOTES
Shakekelvin Yanez  [id number]  1990      REASON FOR CONSULTATION:  Iron deficiency anemia   Provide an opinion on any further workup or treatment                             REQUESTING PHYSICIAN:  Alvaro Vogel MD      RECORDS OBTAINED:  Records of the patients history including those obtained from the referring provider were reviewed and summarized in detail.        History of Present Illness     This is a very pleasant otherwise healthy 29-year-old female who was seen in consultation at the request of Dr Vogel for evaluation of iron deficiency anemia.  Patient unfortunately is a poor historian and most of the history was obtained by reviewing old medical records patient had routine laboratory testing performed by her primary care provider in January 2020 which showed.  Iron deficiency with ferritin of 26 and iron saturation of 11.  Patient tells me that she has been anemic throughout his life.  She is been taking oral iron supplement for 4 to 5 years.  Her hemoglobin was normal at 13.1 however in the past her hemoglobin was as low as 8.4 especially in March 2019.  Patient does report heavy menses for which she is being evaluated by gynecology.  She denies any prior history of IV iron infusion or blood transfusion.  She reports compliance with her oral iron supplements.  Denies any GI side effects.  She is not taking any vitamin B12 or folic acid supplements.  She denies any other blood loss such as bright red blood per rectum or melena.  Denies any family history of anemia.  I been asked to assist with evaluation and management of her iron deficiency anemia.    Active Ambulatory Problems     Diagnosis Date Noted   • Encounter for screening for endocrine disorder 07/27/2017   • Encounter for screening for cardiovascular disorders 07/27/2017   • Nonintractable headache 07/27/2017   • Encounter for screening for diabetes mellitus 07/27/2017   • Tobacco abuse counseling 07/27/2017   • Tobacco user  07/27/2017   • Encounter for tobacco use cessation counseling 03/19/2019   • Acute pharyngitis 03/20/2019   • Iron deficiency anemia 04/20/2019   • Vitamin D deficiency 04/20/2019   • Other fatigue 04/20/2019   • Dyslipidemia 04/20/2019   • Neutrophilia 03/02/2020   • Menorrhagia with regular cycle 03/03/2020     Resolved Ambulatory Problems     Diagnosis Date Noted   • No Resolved Ambulatory Problems     No Additional Past Medical History         No past surgical history on file.     Current Outpatient Medications on File Prior to Visit   Medication Sig Dispense Refill   • cyclobenzaprine (FLEXERIL) 5 MG tablet Take 1 tablet by mouth At Night As Needed for Muscle Spasms. 30 tablet 3   • ferrous sulfate 325 (65 FE) MG tablet Take 1 tablet by mouth 3 (Three) Times a Day With Meals. 90 tablet 3   • nicotine (NICODERM CQ) 21 MG/24HR patch Place 1 patch on the skin as directed by provider Daily. 30 patch 3   • vitamin D (ERGOCALCIFEROL) 1.25 MG (22308 UT) capsule capsule Take 1 capsule by mouth Every 7 (Seven) Days. 4 capsule 3     No current facility-administered medications on file prior to visit.         ALLERGIES:    Allergies   Allergen Reactions   • Latex Rash        Social History     Socioeconomic History   • Marital status:      Spouse name: Not on file   • Number of children: Not on file   • Years of education: Not on file   • Highest education level: Not on file   Tobacco Use   • Smoking status: Current Every Day Smoker   • Smokeless tobacco: Never Used   Substance and Sexual Activity   • Alcohol use: No        Family History   Problem Relation Age of Onset   • Asthma Brother    • Hypertension Maternal Aunt    • Alcohol abuse Maternal Uncle    • Cancer Maternal Uncle    • Diabetes Maternal Grandmother         Review of Systems       CONSTITUTIONAL: fatigue + weakness + No weight loss, fever, chills.  HEENT: Eyes: No visual loss, blurred vision, double vision or yellow sclerae. Ears, Nose, Throat: No  hearing loss, sneezing, congestion, runny nose or sore throat.  SKIN: No rash or itching.  CARDIOVASCULAR: No chest pain, chest pressure or chest discomfort. No palpitations or edema.  RESPIRATORY: No shortness of breath, cough or sputum.  GASTROINTESTINAL: No anorexia, nausea, vomiting or diarrhea. No abdominal pain or blood.  GENITOURINARY: Negative for urgency, frequency or dysuria.   NEUROLOGICAL: No headache, dizziness, syncope, paralysis, ataxia, numbness or tingling in the extremities. No change in bowel or bladder control.  MUSCULOSKELETAL: No muscle, back pain, joint pain or stiffness.  HEMATOLOGIC: anemia + heavy menses +   LYMPHATICS: No enlarged nodes. No history of splenectomy.  PSYCHIATRIC: No history of depression or anxiety.  ENDOCRINOLOGIC: No reports of sweating, cold or heat intolerance. No polyuria or polydipsia.  ALLERGIES: No history of asthma, hives, eczema or rhinitis.      Objective     Vitals:    03/02/20 1505   BP: 132/70   Pulse: 102   Resp: 16   Temp: 98.1 °F (36.7 °C)         Physical Exam      GENERAL: Alert, awake, oriented.  Well dressed.  Not in apparent distress. Vitals as above.   HEAD: Normocephalic, atraumatic.   EYES: PERRL, EOMI.  vision is grossly intact.  NECK: Supple, no adenopathy or thyromegaly.   THROAT: Normal oral cavity and pharynx. No inflammation, swelling, exudate, or lesions.  CARDIAC: Normal S1 and S2. No S3, S4 or murmurs. Rhythm is regular.  Extremities are warm and well perfused.   LUNGS: Clear to auscultation and percussion without rales, rhonchi, wheezing or diminished breath sounds.  ABDOMEN: Positive bowel sounds. Soft, nondistended, nontender. No guarding or rebound. No masses.  BACK:  No bony tenderness.   EXTREMITIES: No significant deformity or joint abnormality.  Peripheral pulses intact. No varicosities.  SKIN: No rash or bruising.  NEUROLOGICAL: Grossly non-focal exam. No focal weakness. Gait: Normal.   PSYCH: Mood and affect normal. No  hallucination or suicidal thoughts.   LYMPHATIC: No cervical, supraclavicular or axillary adenopathy.       RECENT LABS:Independently reviewed and summarized.  Hematology WBC   Date Value Ref Range Status   01/30/2020 13.70 (H) 3.40 - 10.80 10*3/mm3 Final     RBC   Date Value Ref Range Status   01/30/2020 4.67 3.77 - 5.28 10*6/mm3 Final     Hemoglobin   Date Value Ref Range Status   01/30/2020 13.1 12.0 - 15.9 g/dL Final     Hematocrit   Date Value Ref Range Status   01/30/2020 39.2 34.0 - 46.6 % Final     Platelets   Date Value Ref Range Status   01/30/2020 364 140 - 450 10*3/mm3 Final        I have reviewed old records and summarized them in HPI as well as assessment and plan section of this note.     Sarthak Yanez reports a pain score of 0.  Given her pain assessment as noted, treatment options were discussed and the following options were decided upon as a follow-up plan to address the patient's pain: continuation of current treatment plan for pain.    Patient screened positive for depression based on a PHQ-9 score of 6. Follow-up recommendations include: PCP managing depression.      Diagnosis:   (1) Iron deficiency anemia   (2) Neutrophilia   (3) Menorrhagia   (4) Encounter for tobacco use cessation counseling     All are new diagnosis, problems for me    Assessment/Plan     (1) Iron deficiency anemia     Patient with persistent iron deficiency in spite of being on oral iron supplements.  Her ferritin is low at 26 with iron saturation of 11.  She is taking oral iron supplement twice a day.  Her persistent iron deficiency is likely secondary to ongoing heavy menses.  Due to persistent iron deficiency while on being oral iron supplements I believe she could benefit from IV iron infusion    I had an extensive discussion with patient about diagnosis and treatment options. I recommend that we replaced her iron with IV injectofer 750 mg, 2 infusions, one week apart. I also recommend that we should check   iron studies every 3 months after this initial replacement and consider IV iron treatment for ferritin drops to less than 50 or transference saturation dropped to less than 20%.  In addition, she should also be checked for vitamin B12 level at least twice a year.  She should be on oral multivitamin and mineral supplements which are available over-the-counter.    I had an extensive discussion with her about risk versus benefits of IV iron treatment.    I discussed about various risks associated with IV iron such as allergic reaction, hypersensitivity reaction, headache, flushing, joint aches or pains, local IV infiltration and skin discoloration.  After our discussion patient was in agreement and proceeding with IV iron treatment for  anemia.      (2) Neutrophilia: Likely reactive.  Patient does not have any unintentional weight loss or drenching night sweats or high fevers.  No palpable lymphadenopathy or hepatosplenomegaly.  Peripheral smear unremarkable.  Recommend continue monitoring.    (3) Menorrhagia: Recommend follow-up with gynecology.    (4) Encounter for tobacco use cessation counseling: I had 5-7 minutes discussion with the patient about smoking cessation. Problems with continued smoking including respiratory, cardiovascular and oncological problems were discussion. Advice on smoking cessation was reiterated including methods of quitting and different medications and support system available for smoking cessation was discussed.   Patient understand and was agreeable to plan.      Thank you for involving me in Ms Yanez's care.     Please call us if any questions/concerns.     Brennan Arthur MD   Hematology Oncology

## 2020-03-02 ENCOUNTER — CONSULT (OUTPATIENT)
Dept: ONCOLOGY | Facility: CLINIC | Age: 30
End: 2020-03-02

## 2020-03-02 ENCOUNTER — LAB (OUTPATIENT)
Dept: ONCOLOGY | Facility: HOSPITAL | Age: 30
End: 2020-03-02

## 2020-03-02 VITALS
SYSTOLIC BLOOD PRESSURE: 132 MMHG | DIASTOLIC BLOOD PRESSURE: 70 MMHG | WEIGHT: 165.6 LBS | HEART RATE: 102 BPM | RESPIRATION RATE: 16 BRPM | TEMPERATURE: 98.1 F | BODY MASS INDEX: 25.94 KG/M2

## 2020-03-02 DIAGNOSIS — D50.9 IRON DEFICIENCY ANEMIA, UNSPECIFIED IRON DEFICIENCY ANEMIA TYPE: ICD-10-CM

## 2020-03-02 DIAGNOSIS — D50.9 IRON DEFICIENCY ANEMIA, UNSPECIFIED IRON DEFICIENCY ANEMIA TYPE: Primary | ICD-10-CM

## 2020-03-02 DIAGNOSIS — Z71.6 ENCOUNTER FOR TOBACCO USE CESSATION COUNSELING: ICD-10-CM

## 2020-03-02 DIAGNOSIS — D72.9 NEUTROPHILIA: ICD-10-CM

## 2020-03-02 DIAGNOSIS — N92.0 MENORRHAGIA WITH REGULAR CYCLE: ICD-10-CM

## 2020-03-02 LAB
BASOPHILS # BLD AUTO: 0.1 10*3/MM3 (ref 0–0.2)
BASOPHILS NFR BLD AUTO: 0.7 % (ref 0–1.5)
DEPRECATED RDW RBC AUTO: 40.1 FL (ref 37–54)
EOSINOPHIL # BLD AUTO: 0.58 10*3/MM3 (ref 0–0.4)
EOSINOPHIL NFR BLD AUTO: 4.3 % (ref 0.3–6.2)
ERYTHROCYTE [DISTWIDTH] IN BLOOD BY AUTOMATED COUNT: 13.8 % (ref 12.3–15.4)
FERRITIN SERPL-MCNC: 26.68 NG/ML (ref 13–150)
HCT VFR BLD AUTO: 40.2 % (ref 34–46.6)
HGB BLD-MCNC: 13.8 G/DL (ref 12–15.9)
IMM GRANULOCYTES # BLD AUTO: 0.04 10*3/MM3 (ref 0–0.05)
IMM GRANULOCYTES NFR BLD AUTO: 0.3 % (ref 0–0.5)
IRON 24H UR-MRATE: 40 MCG/DL (ref 37–145)
IRON SATN MFR SERPL: 11 % (ref 20–50)
LYMPHOCYTES # BLD AUTO: 1.76 10*3/MM3 (ref 0.7–3.1)
LYMPHOCYTES NFR BLD AUTO: 13 % (ref 19.6–45.3)
MCH RBC QN AUTO: 27.6 PG (ref 26.6–33)
MCHC RBC AUTO-ENTMCNC: 34.3 G/DL (ref 31.5–35.7)
MCV RBC AUTO: 80.4 FL (ref 79–97)
MONOCYTES # BLD AUTO: 0.89 10*3/MM3 (ref 0.1–0.9)
MONOCYTES NFR BLD AUTO: 6.6 % (ref 5–12)
NEUTROPHILS # BLD AUTO: 10.18 10*3/MM3 (ref 1.7–7)
NEUTROPHILS NFR BLD AUTO: 75.1 % (ref 42.7–76)
NRBC BLD AUTO-RTO: 0 /100 WBC (ref 0–0.2)
PLATELET # BLD AUTO: 368 10*3/MM3 (ref 140–450)
PMV BLD AUTO: 10.4 FL (ref 6–12)
RBC # BLD AUTO: 5 10*6/MM3 (ref 3.77–5.28)
TIBC SERPL-MCNC: 368 MCG/DL (ref 298–536)
TRANSFERRIN SERPL-MCNC: 247 MG/DL (ref 200–360)
WBC NRBC COR # BLD: 13.55 10*3/MM3 (ref 3.4–10.8)

## 2020-03-02 PROCEDURE — 99204 OFFICE O/P NEW MOD 45 MIN: CPT | Performed by: INTERNAL MEDICINE

## 2020-03-02 PROCEDURE — G0463 HOSPITAL OUTPT CLINIC VISIT: HCPCS | Performed by: INTERNAL MEDICINE

## 2020-03-02 PROCEDURE — 82728 ASSAY OF FERRITIN: CPT

## 2020-03-02 PROCEDURE — 83540 ASSAY OF IRON: CPT

## 2020-03-02 PROCEDURE — 99406 BEHAV CHNG SMOKING 3-10 MIN: CPT | Performed by: INTERNAL MEDICINE

## 2020-03-02 PROCEDURE — 84466 ASSAY OF TRANSFERRIN: CPT

## 2020-03-02 PROCEDURE — 85025 COMPLETE CBC W/AUTO DIFF WBC: CPT

## 2020-03-02 RX ORDER — SODIUM CHLORIDE 9 MG/ML
250 INJECTION, SOLUTION INTRAVENOUS ONCE
Status: CANCELLED | OUTPATIENT
Start: 2020-03-11

## 2020-03-02 RX ORDER — DIPHENHYDRAMINE HYDROCHLORIDE 50 MG/ML
50 INJECTION INTRAMUSCULAR; INTRAVENOUS AS NEEDED
Status: CANCELLED | OUTPATIENT
Start: 2020-03-11

## 2020-03-02 RX ORDER — MAGNESIUM 200 MG
1000 TABLET ORAL DAILY
Qty: 90 EACH | Refills: 3 | Status: SHIPPED | OUTPATIENT
Start: 2020-03-02 | End: 2020-04-03 | Stop reason: SDUPTHER

## 2020-03-02 RX ORDER — FOLIC ACID 1 MG/1
1 TABLET ORAL DAILY
Qty: 90 TABLET | Refills: 3 | Status: SHIPPED | OUTPATIENT
Start: 2020-03-02 | End: 2020-04-03 | Stop reason: SDUPTHER

## 2020-03-03 ENCOUNTER — TELEPHONE (OUTPATIENT)
Dept: ONCOLOGY | Facility: HOSPITAL | Age: 30
End: 2020-03-03

## 2020-03-03 PROBLEM — N92.0 MENORRHAGIA WITH REGULAR CYCLE: Status: ACTIVE | Noted: 2020-03-03

## 2020-03-03 NOTE — TELEPHONE ENCOUNTER
----- Message from Neymar Arthur MD sent at 3/2/2020 11:38 PM CST -----  Iron is low. Please arrange for IV injectofer x 2.

## 2020-03-09 ENCOUNTER — INFUSION (OUTPATIENT)
Dept: PEDIATRICS | Facility: HOSPITAL | Age: 30
End: 2020-03-09

## 2020-03-09 VITALS
TEMPERATURE: 98.6 F | DIASTOLIC BLOOD PRESSURE: 70 MMHG | SYSTOLIC BLOOD PRESSURE: 125 MMHG | HEART RATE: 100 BPM | RESPIRATION RATE: 18 BRPM

## 2020-03-09 DIAGNOSIS — D50.9 IRON DEFICIENCY ANEMIA, UNSPECIFIED IRON DEFICIENCY ANEMIA TYPE: Primary | ICD-10-CM

## 2020-03-09 PROCEDURE — 25010000002 FERRIC CARBOXYMALTOSE 750 MG/15ML SOLUTION 15 ML VIAL: Performed by: INTERNAL MEDICINE

## 2020-03-09 PROCEDURE — 96374 THER/PROPH/DIAG INJ IV PUSH: CPT | Performed by: INTERNAL MEDICINE

## 2020-03-09 RX ORDER — METHYLPREDNISOLONE SODIUM SUCCINATE 125 MG/2ML
125 INJECTION, POWDER, LYOPHILIZED, FOR SOLUTION INTRAMUSCULAR; INTRAVENOUS AS NEEDED
Status: CANCELLED | OUTPATIENT
Start: 2020-03-16

## 2020-03-09 RX ORDER — SODIUM CHLORIDE 9 MG/ML
250 INJECTION, SOLUTION INTRAVENOUS ONCE
Status: COMPLETED | OUTPATIENT
Start: 2020-03-09 | End: 2020-03-09

## 2020-03-09 RX ORDER — DIPHENHYDRAMINE HYDROCHLORIDE 50 MG/ML
50 INJECTION INTRAMUSCULAR; INTRAVENOUS AS NEEDED
Status: CANCELLED | OUTPATIENT
Start: 2020-03-16

## 2020-03-09 RX ORDER — SODIUM CHLORIDE 9 MG/ML
250 INJECTION, SOLUTION INTRAVENOUS ONCE
Status: CANCELLED | OUTPATIENT
Start: 2020-03-16

## 2020-03-09 RX ADMIN — SODIUM CHLORIDE 250 ML: 9 INJECTION, SOLUTION INTRAVENOUS at 15:10

## 2020-03-09 RX ADMIN — FERRIC CARBOXYMALTOSE INJECTION 750 MG: 50 INJECTION, SOLUTION INTRAVENOUS at 15:10

## 2020-03-16 ENCOUNTER — APPOINTMENT (OUTPATIENT)
Dept: ONCOLOGY | Facility: HOSPITAL | Age: 30
End: 2020-03-16

## 2020-03-17 ENCOUNTER — APPOINTMENT (OUTPATIENT)
Dept: INFUSION THERAPY | Facility: HOSPITAL | Age: 30
End: 2020-03-17

## 2020-03-18 ENCOUNTER — CLINICAL SUPPORT (OUTPATIENT)
Dept: FAMILY MEDICINE CLINIC | Facility: CLINIC | Age: 30
End: 2020-03-18

## 2020-03-18 DIAGNOSIS — Z11.1 ENCOUNTER FOR PPD TEST: Primary | ICD-10-CM

## 2020-03-18 PROCEDURE — 86580 TB INTRADERMAL TEST: CPT | Performed by: NURSE PRACTITIONER

## 2020-03-24 ENCOUNTER — APPOINTMENT (OUTPATIENT)
Dept: INFUSION THERAPY | Facility: HOSPITAL | Age: 30
End: 2020-03-24

## 2020-04-02 ENCOUNTER — TELEPHONE (OUTPATIENT)
Dept: FAMILY MEDICINE CLINIC | Facility: CLINIC | Age: 30
End: 2020-04-02

## 2020-04-02 NOTE — TELEPHONE ENCOUNTER
PT called to request that PCP call in medication to combat UTI symptoms. Reports painful/burning urination, denies fever and foul odor.     Please call [facility] with any questions/concerns: [phone]    Confirmed Pharmacy:   [facility] [address] [place] [address] [facility] - [phone]  - [phone]   [address] [place] [address]  Phone: [phone] Fax: [phone]

## 2020-04-03 ENCOUNTER — OFFICE VISIT (OUTPATIENT)
Dept: FAMILY MEDICINE CLINIC | Facility: CLINIC | Age: 30
End: 2020-04-03

## 2020-04-03 VITALS — HEIGHT: 67 IN | BODY MASS INDEX: 25.94 KG/M2

## 2020-04-03 DIAGNOSIS — E78.5 DYSLIPIDEMIA: ICD-10-CM

## 2020-04-03 DIAGNOSIS — Z72.0 TOBACCO USER: ICD-10-CM

## 2020-04-03 DIAGNOSIS — N39.0 URINARY TRACT INFECTION WITHOUT HEMATURIA, SITE UNSPECIFIED: Primary | ICD-10-CM

## 2020-04-03 DIAGNOSIS — E55.9 VITAMIN D DEFICIENCY: ICD-10-CM

## 2020-04-03 DIAGNOSIS — D50.9 IRON DEFICIENCY ANEMIA, UNSPECIFIED IRON DEFICIENCY ANEMIA TYPE: ICD-10-CM

## 2020-04-03 DIAGNOSIS — D72.9 NEUTROPHILIA: ICD-10-CM

## 2020-04-03 DIAGNOSIS — N92.0 MENORRHAGIA WITH REGULAR CYCLE: ICD-10-CM

## 2020-04-03 PROCEDURE — 99213 OFFICE O/P EST LOW 20 MIN: CPT | Performed by: FAMILY MEDICINE

## 2020-04-03 RX ORDER — NITROFURANTOIN 25; 75 MG/1; MG/1
100 CAPSULE ORAL 2 TIMES DAILY
Qty: 14 CAPSULE | Refills: 0 | Status: SHIPPED | OUTPATIENT
Start: 2020-04-03 | End: 2020-04-17 | Stop reason: ALTCHOICE

## 2020-04-03 RX ORDER — MAGNESIUM 200 MG
1000 TABLET ORAL DAILY
Qty: 90 EACH | Refills: 3 | Status: SHIPPED | OUTPATIENT
Start: 2020-04-03 | End: 2020-08-25

## 2020-04-03 RX ORDER — FOLIC ACID 1 MG/1
1 TABLET ORAL DAILY
Qty: 90 TABLET | Refills: 3 | Status: SHIPPED | OUTPATIENT
Start: 2020-04-03 | End: 2020-08-25 | Stop reason: SDUPTHER

## 2020-04-03 NOTE — PROGRESS NOTES
Subjective:  Sarthak Yanez is a 29 y.o. female who presents for       Patient Active Problem List   Diagnosis   • Encounter for screening for endocrine disorder   • Encounter for screening for cardiovascular disorders   • Nonintractable headache   • Encounter for screening for diabetes mellitus   • Tobacco abuse counseling   • Tobacco user   • Encounter for tobacco use cessation counseling   • Acute pharyngitis   • Iron deficiency anemia   • Vitamin D deficiency   • Other fatigue   • Dyslipidemia   • Neutrophilia   • Menorrhagia with regular cycle           Current Outpatient Medications:   •  Cyanocobalamin (B-12) 1000 MCG sublingual tablet, Place 1,000 mcg under the tongue Daily., Disp: 90 each, Rfl: 3  •  cyclobenzaprine (FLEXERIL) 5 MG tablet, Take 1 tablet by mouth At Night As Needed for Muscle Spasms., Disp: 30 tablet, Rfl: 3  •  ferrous sulfate 325 (65 FE) MG tablet, Take 1 tablet by mouth 3 (Three) Times a Day With Meals., Disp: 90 tablet, Rfl: 3  •  folic acid (FOLVITE) 1 MG tablet, Take 1 tablet by mouth Daily., Disp: 90 tablet, Rfl: 3  •  nicotine (NICODERM CQ) 21 MG/24HR patch, Place 1 patch on the skin as directed by provider Daily., Disp: 30 patch, Rfl: 3  •  vitamin D (ERGOCALCIFEROL) 1.25 MG (27771 UT) capsule capsule, Take 1 capsule by mouth Every 7 (Seven) Days., Disp: 4 capsule, Rfl: 3  •  nitrofurantoin, macrocrystal-monohydrate, (Macrobid) 100 MG capsule, Take 1 capsule by mouth 2 (Two) Times a Day., Disp: 14 capsule, Rfl: 0      Pt is 28 yo female with management of tobacco use, Vitamin D deficiency, iron deficiency anemia. Leukocytosis/neutrophilia         1/6/20 pt is here for recheck and followup.  She states today her lower back has been hurting for a month. She picked up her daughter at home and it has been hurting since then. She has not sought treatment until today. Pain I 8/10 on severity.  She has tried ibuprofen with no relief. No PT/OT.  She also states pain radiates from  lower back down back of both legs.  No issues with bowel or bladder problems. Shecontinues to smoke tobacco. She did not get chantix filled due to cost. She is currently not pregnant. Her last menstrual period was last month. She works 2 jobs at [employer] and at [employer]     4/2/20 Telemedicine Visit.  Pt recently hand labwork done on 3/2/20 that showed iron profile. Showed iron saturation low at 11. CBC showed normal hemoglobin with WBC at 13.3. Ferritin was at 26.68.  She has seen Hematologyl/Oncology Dr. Amin for iron deficiency anemia and iron infusion therapy. Done on 3/9/20   Also had labwork on 1/30/20 that showed low vitamin D. Lipid panel and CMP were normal. CBC showed leukocytosis at 13.70 with elevated neurtrophil count. She is having pain on urination, lower back pain. Odor to urine. She is using restroom more frequently. No fever no chills. She is minor cough. No dizziness. She continues to smoke 1/2 ppd. She continues to have heavy menstrual cycle and has it 2-3 times a month        Urinary Tract Infection    This is a new problem. The problem occurs every urination. The quality of the pain is described as burning. The pain is at a severity of 3/10. The patient is experiencing no pain. There has been no fever. She is not sexually active. There is no history of pyelonephritis. Pertinent negatives include no chills, discharge, flank pain, frequency, hematuria, hesitancy, nausea, possible pregnancy, sweats, urgency or vomiting. The treatment provided no relief.   Anemia   Presents for follow-up visit. There has been no abdominal pain, anorexia, bruising/bleeding easily, confusion, fever, leg swelling, light-headedness, malaise/fatigue, pallor, palpitations, paresthesias, pica or weight loss. Signs of blood loss that are not present include hematemesis, hematochezia, melena, menorrhagia and vaginal bleeding.   Leg Pain    The incident occurred more than 1 week ago. The incident  occurred at home. The injury mechanism is unknown. The pain is present in the right leg and left leg. The pain is at a severity of 4/10. The pain has been constant since onset. Associated symptoms include a loss of motion, muscle weakness, numbness and tingling. Pertinent negatives include no inability to bear weight. The symptoms are aggravated by movement and palpation. She has tried nothing for the symptoms. The treatment provided no relief.   Back Pain   This is a recurrent problem. The current episode started more than 1 month ago. The problem is unchanged. The pain is present in the gluteal and lumbar spine. The quality of the pain is described as aching. The pain does not radiate. The pain is at a severity of 4/10. The pain is moderate. The pain is the same all the time. The symptoms are aggravated by bending, lying down and position. Stiffness is present all day. Associated symptoms include leg pain, numbness, tingling and weakness. Pertinent negatives include no abdominal pain, bladder incontinence, bowel incontinence, chest pain, dysuria, fever, headaches, paresis, paresthesias, pelvic pain, perianal numbness or weight loss. She has tried nothing for the symptoms. The treatment provided no relief.          Review of Systems  Review of Systems   Constitutional: Positive for activity change and fatigue. Negative for appetite change, chills, diaphoresis, fever, malaise/fatigue and weight loss.   HENT: Negative for congestion, postnasal drip, rhinorrhea, sinus pressure, sinus pain, sneezing, sore throat, trouble swallowing and voice change.    Respiratory: Negative for cough, choking, chest tightness, shortness of breath, wheezing and stridor.    Cardiovascular: Negative for chest pain and palpitations.   Gastrointestinal: Negative for abdominal pain, anorexia, diarrhea, hematemesis, hematochezia, melena, nausea and vomiting.   Genitourinary: Positive for dysuria and menstrual problem. Negative for flank pain,  frequency, hematuria, hesitancy, menorrhagia, urgency and vaginal bleeding.        Pain on urination     Menorrhagia    Skin: Negative for pallor.   Neurological: Positive for weakness and numbness. Negative for light-headedness, headaches and paresthesias.   Hematological: Does not bruise/bleed easily.   Psychiatric/Behavioral: Negative for confusion.       Patient Active Problem List   Diagnosis   • Encounter for screening for endocrine disorder   • Encounter for screening for cardiovascular disorders   • Nonintractable headache   • Encounter for screening for diabetes mellitus   • Tobacco abuse counseling   • Tobacco user   • Encounter for tobacco use cessation counseling   • Acute pharyngitis   • Iron deficiency anemia   • Vitamin D deficiency   • Other fatigue   • Dyslipidemia   • Neutrophilia   • Menorrhagia with regular cycle     Past Surgical History:   Procedure Laterality Date   •  SECTION     • MOUTH SURGERY       Social History     Socioeconomic History   • Marital status:      Spouse name: Not on file   • Number of children: Not on file   • Years of education: Not on file   • Highest education level: Not on file   Tobacco Use   • Smoking status: Current Every Day Smoker   • Smokeless tobacco: Never Used   Substance and Sexual Activity   • Alcohol use: No     Family History   Problem Relation Age of Onset   • Asthma Brother    • Hypertension Maternal Aunt    • Alcohol abuse Maternal Uncle    • Cancer Maternal Uncle    • Diabetes Maternal Grandmother      Lab on 2020   Component Date Value Ref Range Status   • Ferritin 2020 26.68  13.00 - 150.00 ng/mL Final   • Iron 2020 40  37 - 145 mcg/dL Final   • Iron Saturation 2020 11* 20 - 50 % Final   • Transferrin 2020 247  200 - 360 mg/dL Final   • TIBC 2020 368  298 - 536 mcg/dL Final   • WBC 2020 13.55* 3.40 - 10.80 10*3/mm3 Final   • RBC 2020 5.00  3.77 - 5.28 10*6/mm3 Final   • Hemoglobin  03/02/2020 13.8  12.0 - 15.9 g/dL Final   • Hematocrit 03/02/2020 40.2  34.0 - 46.6 % Final   • MCV 03/02/2020 80.4  79.0 - 97.0 fL Final   • MCH 03/02/2020 27.6  26.6 - 33.0 pg Final   • MCHC 03/02/2020 34.3  31.5 - 35.7 g/dL Final   • RDW 03/02/2020 13.8  12.3 - 15.4 % Final   • RDW-SD 03/02/2020 40.1  37.0 - 54.0 fl Final   • MPV 03/02/2020 10.4  6.0 - 12.0 fL Final   • Platelets 03/02/2020 368  140 - 450 10*3/mm3 Final   • Neutrophil % 03/02/2020 75.1  42.7 - 76.0 % Final   • Lymphocyte % 03/02/2020 13.0* 19.6 - 45.3 % Final   • Monocyte % 03/02/2020 6.6  5.0 - 12.0 % Final   • Eosinophil % 03/02/2020 4.3  0.3 - 6.2 % Final   • Basophil % 03/02/2020 0.7  0.0 - 1.5 % Final   • Immature Grans % 03/02/2020 0.3  0.0 - 0.5 % Final   • Neutrophils, Absolute 03/02/2020 10.18* 1.70 - 7.00 10*3/mm3 Final   • Lymphocytes, Absolute 03/02/2020 1.76  0.70 - 3.10 10*3/mm3 Final   • Monocytes, Absolute 03/02/2020 0.89  0.10 - 0.90 10*3/mm3 Final   • Eosinophils, Absolute 03/02/2020 0.58* 0.00 - 0.40 10*3/mm3 Final   • Basophils, Absolute 03/02/2020 0.10  0.00 - 0.20 10*3/mm3 Final   • Immature Grans, Absolute 03/02/2020 0.04  0.00 - 0.05 10*3/mm3 Final   • nRBC 03/02/2020 0.0  0.0 - 0.2 /100 WBC Final   Lab on 01/30/2020   Component Date Value Ref Range Status   • WBC 01/30/2020 13.70* 3.40 - 10.80 10*3/mm3 Final   • RBC 01/30/2020 4.67  3.77 - 5.28 10*6/mm3 Final   • Hemoglobin 01/30/2020 13.1  12.0 - 15.9 g/dL Final   • Hematocrit 01/30/2020 39.2  34.0 - 46.6 % Final   • MCV 01/30/2020 83.9  79.0 - 97.0 fL Final   • MCH 01/30/2020 28.1  26.6 - 33.0 pg Final   • MCHC 01/30/2020 33.4  31.5 - 35.7 g/dL Final   • RDW 01/30/2020 14.1  12.3 - 15.4 % Final   • RDW-SD 01/30/2020 43.4  37.0 - 54.0 fl Final   • MPV 01/30/2020 11.4  6.0 - 12.0 fL Final   • Platelets 01/30/2020 364  140 - 450 10*3/mm3 Final   • Neutrophil % 01/30/2020 77.8* 42.7 - 76.0 % Final   • Lymphocyte % 01/30/2020 11.5* 19.6 - 45.3 % Final   • Monocyte %  01/30/2020 5.3  5.0 - 12.0 % Final   • Eosinophil % 01/30/2020 4.6  0.3 - 6.2 % Final   • Basophil % 01/30/2020 0.6  0.0 - 1.5 % Final   • Immature Grans % 01/30/2020 0.2  0.0 - 0.5 % Final   • Neutrophils, Absolute 01/30/2020 10.67* 1.70 - 7.00 10*3/mm3 Final   • Lymphocytes, Absolute 01/30/2020 1.57  0.70 - 3.10 10*3/mm3 Final   • Monocytes, Absolute 01/30/2020 0.72  0.10 - 0.90 10*3/mm3 Final   • Eosinophils, Absolute 01/30/2020 0.63* 0.00 - 0.40 10*3/mm3 Final   • Basophils, Absolute 01/30/2020 0.08  0.00 - 0.20 10*3/mm3 Final   • Immature Grans, Absolute 01/30/2020 0.03  0.00 - 0.05 10*3/mm3 Final   • nRBC 01/30/2020 0.0  0.0 - 0.2 /100 WBC Final   • Glucose 01/30/2020 84  65 - 99 mg/dL Final   • BUN 01/30/2020 8  6 - 20 mg/dL Final   • Creatinine 01/30/2020 0.71  0.57 - 1.00 mg/dL Final   • Sodium 01/30/2020 140  136 - 145 mmol/L Final   • Potassium 01/30/2020 3.7  3.5 - 5.2 mmol/L Final   • Chloride 01/30/2020 105  98 - 107 mmol/L Final   • CO2 01/30/2020 23.9  22.0 - 29.0 mmol/L Final   • Calcium 01/30/2020 8.9  8.6 - 10.5 mg/dL Final   • Total Protein 01/30/2020 6.9  6.0 - 8.5 g/dL Final   • Albumin 01/30/2020 4.10  3.50 - 5.20 g/dL Final   • ALT (SGPT) 01/30/2020 10  1 - 33 U/L Final   • AST (SGOT) 01/30/2020 15  1 - 32 U/L Final   • Alkaline Phosphatase 01/30/2020 46  39 - 117 U/L Final   • Total Bilirubin 01/30/2020 0.3  0.2 - 1.2 mg/dL Final   • eGFR  African Amer 01/30/2020 118  >60 mL/min/1.73 Final   • Globulin 01/30/2020 2.8  gm/dL Final   • A/G Ratio 01/30/2020 1.5  g/dL Final   • BUN/Creatinine Ratio 01/30/2020 11.3  7.0 - 25.0 Final   • Anion Gap 01/30/2020 11.1  5.0 - 15.0 mmol/L Final   • Total Cholesterol 01/30/2020 119  0 - 200 mg/dL Final   • Triglycerides 01/30/2020 44  0 - 150 mg/dL Final   • HDL Cholesterol 01/30/2020 43  40 - 60 mg/dL Final   • LDL Cholesterol  01/30/2020 67  0 - 100 mg/dL Final   • VLDL Cholesterol 01/30/2020 8.8  5 - 40 mg/dL Final   • LDL/HDL Ratio 01/30/2020 1.56    "Final   • 25 Hydroxy, Vitamin D 01/30/2020 21.5* 30.0 - 100.0 ng/ml Final   • Iron 01/30/2020 42  37 - 145 mcg/dL Final   • Iron Saturation 01/30/2020 11* 20 - 50 % Final   • Transferrin 01/30/2020 264  200 - 360 mg/dL Final   • TIBC 01/30/2020 393  298 - 536 mcg/dL Final   • Ferritin 01/30/2020 26.10  13.00 - 150.00 ng/mL Final   • Hemoglobin A1C 01/30/2020 4.83  4.80 - 5.60 % Final      XR Spine Lumbar Complete 4+VW  Narrative: Procedure:  Lumbar spine        Indication:  Back pain   .    Technique:  Five views   .    Prior relevant exam:  None.      No evidence of acute bony, soft tissue, or joint abnormality is  noted. Bone mineralization is within normal limits. The  visualized joint spaces appear intact. Normal lumbar spine.  Impression: CONCLUSION:   1.  Normal lumbar spine.       Electronically signed by:  Alec Prescott MD  1/6/2020 2:45 PM [facility]  Workstation: [email or web address]    [email or web address]  Immunization History   Administered Date(s) Administered   • PPD Test 03/18/2020   • Pneumococcal Polysaccharide (PPSV23) 10/05/2017   • Tdap 10/05/2017       The following portions of the patient's history were reviewed and updated as appropriate: allergies, current medications, past family history, past medical history, past social history, past surgical history and problem list.        Physical Exam  Ht 170.2 cm (67\")   LMP 03/25/2020   BMI 25.94 kg/m²     Physical Exam   Constitutional: She is oriented to person, place, and time. She appears well-developed and well-nourished.   HENT:   Head: Normocephalic and atraumatic.   Right Ear: External ear normal.   Eyes: Pupils are equal, round, and reactive to light. Conjunctivae and EOM are normal.   Neck: Normal range of motion. Neck supple.   Cardiovascular: Normal rate, regular rhythm and normal heart sounds.   No murmur heard.  Pulmonary/Chest: Effort normal and breath sounds normal. No respiratory distress.   Abdominal: Soft. Bowel sounds are normal. She exhibits no " distension. There is no tenderness.   Musculoskeletal: Normal range of motion. She exhibits no edema or deformity.   Neurological: She is alert and oriented to person, place, and time. No cranial nerve deficit.   Skin: Skin is warm. No rash noted. She is not diaphoretic. No erythema. No pallor.   Psychiatric: She has a normal mood and affect. Her behavior is normal.   Nursing note and vitals reviewed.      Assessment/Plan    Diagnosis Plan   1. Urinary tract infection without hematuria, site unspecified  Urinalysis With Microscopic - Urine, Clean Catch    Urine Culture - Urine, Urine, Clean Catch   2. Vitamin D deficiency     3. Tobacco user     4. Iron deficiency anemia, unspecified iron deficiency anemia type  US Non-ob Transvaginal    Ambulatory Referral to Obstetrics / Gynecology   5. Dyslipidemia     6. Menorrhagia with regular cycle  US Non-ob Transvaginal    Ambulatory Referral to Obstetrics / Gynecology   7. Neutrophilia        -went over labwork  -UTI - will get UA and urine culture. Start on macrobid 100 mg PO BID for 7 days   -leg pain/back pain -x-ray of lumbar spine. Recommend PT/OT. Start on mobic 15 mg daily. Gave drug information. Flexeril 5 mg at bedtime PRN   -iron deficiency anemia/menorrhagia - ferrous sulfate 325 mg PO TID. Hematology following. Pt had iron infusion therapy. Was supposed to receive another one.   -leukocyosis/neurtophiia - Hematology following likely reactive.    -menorrhagia - recommend followup with OB/lGYN. Will refer back also will reorder transvaginal [facility] at [facility]  -vitamin D deficiency - vitamin D3 50,000 units once a week   - tobacco user - nicotine patches reordered. Recommended 1 800 QUIT NOW. Counseled >5 minutes to quit. Will try chantix starting pack and maintenance pack   -advised pt to be safe and call with questions and concerns  -advised pt to go to ER or call 911 if symptoms worrisome or severe  -advised pt to followup with specialist and referrals   -advised pt  be safe and take precaution during COVID-19 pandemic  -recheck in 1 week for recheck   This visit has been rescheduled as a phone visit to comply with patient safety concerns in accordance with CDC recommendations. Total time of discussion was 15 minutes.          This document has been electronically signed by Alvaro Vogel MD on April 3, 2020 11:30

## 2020-04-07 RX ORDER — FERROUS SULFATE 325(65) MG
325 TABLET ORAL
Qty: 90 TABLET | Refills: 3 | Status: SHIPPED | OUTPATIENT
Start: 2020-04-07 | End: 2020-08-25 | Stop reason: SDUPTHER

## 2020-04-08 NOTE — PROGRESS NOTES
Subjective:  Sarthak Yanez is a 29 y.o. female who presents for       Patient Active Problem List   Diagnosis   • Encounter for screening for endocrine disorder   • Encounter for screening for cardiovascular disorders   • Nonintractable headache   • Encounter for screening for diabetes mellitus   • Tobacco abuse counseling   • Tobacco user   • Encounter for tobacco use cessation counseling   • Acute pharyngitis   • Iron deficiency anemia   • Vitamin D deficiency   • Other fatigue   • Dyslipidemia   • Neutrophilia   • Menorrhagia with regular cycle   • Menstrual cramp   • Urinary tract infection without hematuria           Current Outpatient Medications:   •  Cyanocobalamin (B-12) 1000 MCG sublingual tablet, Place 1,000 mcg under the tongue Daily., Disp: 90 each, Rfl: 3  •  cyclobenzaprine (FLEXERIL) 5 MG tablet, Take 1 tablet by mouth At Night As Needed for Muscle Spasms., Disp: 30 tablet, Rfl: 3  •  ferrous sulfate 325 (65 FE) MG tablet, Take 1 tablet by mouth 3 (Three) Times a Day With Meals., Disp: 90 tablet, Rfl: 3  •  folic acid (FOLVITE) 1 MG tablet, Take 1 tablet by mouth Daily., Disp: 90 tablet, Rfl: 3  •  nicotine (NICODERM CQ) 21 MG/24HR patch, Place 1 patch on the skin as directed by provider Daily., Disp: 30 patch, Rfl: 3  •  nitrofurantoin, macrocrystal-monohydrate, (Macrobid) 100 MG capsule, Take 1 capsule by mouth 2 (Two) Times a Day., Disp: 14 capsule, Rfl: 0  •  vitamin D (ERGOCALCIFEROL) 1.25 MG (45962 UT) capsule capsule, Take 1 capsule by mouth Every 7 (Seven) Days., Disp: 4 capsule, Rfl: 3    Pt is 28 yo female with management of tobacco use, Vitamin D deficiency, iron deficiency anemia. Leukocytosis/neutrophilia        4/2/20 Telemedicine Visit.  Pt recently hand labwork done on 3/2/20 that showed iron profile. Showed iron saturation low at 11. CBC showed normal hemoglobin with WBC at 13.3. Ferritin was at 26.68.  She has seen Hematologyl/Oncology Dr. Amin for iron deficiency  anemia and iron infusion therapy. Done on 3/9/20   Also had labwork on 1/30/20 that showed low vitamin D. Lipid panel and CMP were normal. CBC showed leukocytosis at 13.70 with elevated neurtrophil count. She is having pain on urination, lower back pain. Odor to urine. She is using restroom more frequently. No fever no chills. She is minor cough. No dizziness. She continues to smoke 1/2 ppd. She continues to have heavy menstrual cycle and has it 2-3 times a month     4/10/20 Telemedicine Visit. Pt is here for recheck on UTI. She was given abx macrobid 100 mg PO BID but pt did not get UA and urine culture and was advised to  Prior to taking abx. She did not get Urine studies due to menstrual cycle. She states it hurts on left side of abdomen. No blood in stool She is not taking anything OT. She has been taking  Macrobid.   She has cut back on smoking to 3-4 cigarettes a day          Menstrual Problem   This is a recurrent problem. The current episode started in the past 7 days. The problem occurs constantly. The problem has been waxing and waning. Associated symptoms include abdominal pain, fatigue, numbness and weakness. Pertinent negatives include no anorexia, arthralgias, change in bowel habit, chest pain, chills, congestion, coughing, diaphoresis, fever, headaches, joint swelling, myalgias, nausea, neck pain, rash, sore throat, swollen glands, urinary symptoms, vertigo, visual change or vomiting. Nothing aggravates the symptoms. She has tried nothing for the symptoms. The treatment provided no relief.   Urinary Tract Infection    This is a new problem. The problem occurs every urination. The quality of the pain is described as burning. The pain is at a severity of 3/10. The patient is experiencing no pain. There has been no fever. She is not sexually active. There is no history of pyelonephritis. Pertinent negatives include no chills, discharge, flank pain, frequency, hematuria, hesitancy, nausea, possible  pregnancy, sweats, urgency or vomiting. The treatment provided no relief.   Anemia   Presents for follow-up visit. There has been no abdominal pain, anorexia, bruising/bleeding easily, confusion, fever, leg swelling, light-headedness, malaise/fatigue, pallor, palpitations, paresthesias, pica or weight loss. Signs of blood loss that are not present include hematemesis, hematochezia, melena, menorrhagia and vaginal bleeding.   Leg Pain    The incident occurred more than 1 week ago. The incident occurred at home. The injury mechanism is unknown. The pain is present in the right leg and left leg. The pain is at a severity of 4/10. The pain has been constant since onset. Associated symptoms include a loss of motion, muscle weakness, numbness and tingling. Pertinent negatives include no inability to bear weight. The symptoms are aggravated by movement and palpation. She has tried nothing for the symptoms. The treatment provided no relief.   Back Pain   This is a recurrent problem. The current episode started more than 1 month ago. The problem is unchanged. The pain is present in the gluteal and lumbar spine. The quality of the pain is described as aching. The pain does not radiate. The pain is at a severity of 4/10. The pain is moderate. The pain is the same all the time. The symptoms are aggravated by bending, lying down and position. Stiffness is present all day. Associated symptoms include leg pain, numbness, tingling and weakness. Pertinent negatives include no abdominal pain, bladder incontinence, bowel incontinence, chest pain, dysuria, fever, headaches, paresis, paresthesias, pelvic pain, perianal numbness or weight loss. She has tried nothing for the     Review of Systems  Review of Systems   Constitutional: Positive for activity change and fatigue. Negative for appetite change, chills, diaphoresis and fever.   HENT: Negative for congestion, postnasal drip, rhinorrhea, sinus pressure, sinus pain, sneezing, sore  throat, trouble swallowing and voice change.    Respiratory: Negative for cough, choking, chest tightness, shortness of breath, wheezing and stridor.    Cardiovascular: Negative for chest pain.   Gastrointestinal: Positive for abdominal pain. Negative for anorexia, change in bowel habit, diarrhea, nausea and vomiting.   Genitourinary: Positive for dysuria and menstrual problem.        Pain on urination    Menstrual cramps   Musculoskeletal: Negative for arthralgias, joint swelling, myalgias and neck pain.   Skin: Negative for rash.   Neurological: Positive for weakness and numbness. Negative for vertigo and headaches.       Patient Active Problem List   Diagnosis   • Encounter for screening for endocrine disorder   • Encounter for screening for cardiovascular disorders   • Nonintractable headache   • Encounter for screening for diabetes mellitus   • Tobacco abuse counseling   • Tobacco user   • Encounter for tobacco use cessation counseling   • Acute pharyngitis   • Iron deficiency anemia   • Vitamin D deficiency   • Other fatigue   • Dyslipidemia   • Neutrophilia   • Menorrhagia with regular cycle   • Menstrual cramp   • Urinary tract infection without hematuria     Past Surgical History:   Procedure Laterality Date   •  SECTION     • MOUTH SURGERY       Social History     Socioeconomic History   • Marital status:      Spouse name: Not on file   • Number of children: Not on file   • Years of education: Not on file   • Highest education level: Not on file   Tobacco Use   • Smoking status: Current Every Day Smoker   • Smokeless tobacco: Never Used   Substance and Sexual Activity   • Alcohol use: No     Family History   Problem Relation Age of Onset   • Asthma Brother    • Hypertension Maternal Aunt    • Alcohol abuse Maternal Uncle    • Cancer Maternal Uncle    • Diabetes Maternal Grandmother      Lab on 2020   Component Date Value Ref Range Status   • Ferritin 2020 26.68  13.00 - 150.00  ng/mL Final   • Iron 03/02/2020 40  37 - 145 mcg/dL Final   • Iron Saturation 03/02/2020 11* 20 - 50 % Final   • Transferrin 03/02/2020 247  200 - 360 mg/dL Final   • TIBC 03/02/2020 368  298 - 536 mcg/dL Final   • WBC 03/02/2020 13.55* 3.40 - 10.80 10*3/mm3 Final   • RBC 03/02/2020 5.00  3.77 - 5.28 10*6/mm3 Final   • Hemoglobin 03/02/2020 13.8  12.0 - 15.9 g/dL Final   • Hematocrit 03/02/2020 40.2  34.0 - 46.6 % Final   • MCV 03/02/2020 80.4  79.0 - 97.0 fL Final   • MCH 03/02/2020 27.6  26.6 - 33.0 pg Final   • MCHC 03/02/2020 34.3  31.5 - 35.7 g/dL Final   • RDW 03/02/2020 13.8  12.3 - 15.4 % Final   • RDW-SD 03/02/2020 40.1  37.0 - 54.0 fl Final   • MPV 03/02/2020 10.4  6.0 - 12.0 fL Final   • Platelets 03/02/2020 368  140 - 450 10*3/mm3 Final   • Neutrophil % 03/02/2020 75.1  42.7 - 76.0 % Final   • Lymphocyte % 03/02/2020 13.0* 19.6 - 45.3 % Final   • Monocyte % 03/02/2020 6.6  5.0 - 12.0 % Final   • Eosinophil % 03/02/2020 4.3  0.3 - 6.2 % Final   • Basophil % 03/02/2020 0.7  0.0 - 1.5 % Final   • Immature Grans % 03/02/2020 0.3  0.0 - 0.5 % Final   • Neutrophils, Absolute 03/02/2020 10.18* 1.70 - 7.00 10*3/mm3 Final   • Lymphocytes, Absolute 03/02/2020 1.76  0.70 - 3.10 10*3/mm3 Final   • Monocytes, Absolute 03/02/2020 0.89  0.10 - 0.90 10*3/mm3 Final   • Eosinophils, Absolute 03/02/2020 0.58* 0.00 - 0.40 10*3/mm3 Final   • Basophils, Absolute 03/02/2020 0.10  0.00 - 0.20 10*3/mm3 Final   • Immature Grans, Absolute 03/02/2020 0.04  0.00 - 0.05 10*3/mm3 Final   • nRBC 03/02/2020 0.0  0.0 - 0.2 /100 WBC Final   Lab on 01/30/2020   Component Date Value Ref Range Status   • WBC 01/30/2020 13.70* 3.40 - 10.80 10*3/mm3 Final   • RBC 01/30/2020 4.67  3.77 - 5.28 10*6/mm3 Final   • Hemoglobin 01/30/2020 13.1  12.0 - 15.9 g/dL Final   • Hematocrit 01/30/2020 39.2  34.0 - 46.6 % Final   • MCV 01/30/2020 83.9  79.0 - 97.0 fL Final   • MCH 01/30/2020 28.1  26.6 - 33.0 pg Final   • MCHC 01/30/2020 33.4  31.5 - 35.7 g/dL  Final   • RDW 01/30/2020 14.1  12.3 - 15.4 % Final   • RDW-SD 01/30/2020 43.4  37.0 - 54.0 fl Final   • MPV 01/30/2020 11.4  6.0 - 12.0 fL Final   • Platelets 01/30/2020 364  140 - 450 10*3/mm3 Final   • Neutrophil % 01/30/2020 77.8* 42.7 - 76.0 % Final   • Lymphocyte % 01/30/2020 11.5* 19.6 - 45.3 % Final   • Monocyte % 01/30/2020 5.3  5.0 - 12.0 % Final   • Eosinophil % 01/30/2020 4.6  0.3 - 6.2 % Final   • Basophil % 01/30/2020 0.6  0.0 - 1.5 % Final   • Immature Grans % 01/30/2020 0.2  0.0 - 0.5 % Final   • Neutrophils, Absolute 01/30/2020 10.67* 1.70 - 7.00 10*3/mm3 Final   • Lymphocytes, Absolute 01/30/2020 1.57  0.70 - 3.10 10*3/mm3 Final   • Monocytes, Absolute 01/30/2020 0.72  0.10 - 0.90 10*3/mm3 Final   • Eosinophils, Absolute 01/30/2020 0.63* 0.00 - 0.40 10*3/mm3 Final   • Basophils, Absolute 01/30/2020 0.08  0.00 - 0.20 10*3/mm3 Final   • Immature Grans, Absolute 01/30/2020 0.03  0.00 - 0.05 10*3/mm3 Final   • nRBC 01/30/2020 0.0  0.0 - 0.2 /100 WBC Final   • Glucose 01/30/2020 84  65 - 99 mg/dL Final   • BUN 01/30/2020 8  6 - 20 mg/dL Final   • Creatinine 01/30/2020 0.71  0.57 - 1.00 mg/dL Final   • Sodium 01/30/2020 140  136 - 145 mmol/L Final   • Potassium 01/30/2020 3.7  3.5 - 5.2 mmol/L Final   • Chloride 01/30/2020 105  98 - 107 mmol/L Final   • CO2 01/30/2020 23.9  22.0 - 29.0 mmol/L Final   • Calcium 01/30/2020 8.9  8.6 - 10.5 mg/dL Final   • Total Protein 01/30/2020 6.9  6.0 - 8.5 g/dL Final   • Albumin 01/30/2020 4.10  3.50 - 5.20 g/dL Final   • ALT (SGPT) 01/30/2020 10  1 - 33 U/L Final   • AST (SGOT) 01/30/2020 15  1 - 32 U/L Final   • Alkaline Phosphatase 01/30/2020 46  39 - 117 U/L Final   • Total Bilirubin 01/30/2020 0.3  0.2 - 1.2 mg/dL Final   • eGFR  African Amer 01/30/2020 118  >60 mL/min/1.73 Final   • Globulin 01/30/2020 2.8  gm/dL Final   • A/G Ratio 01/30/2020 1.5  g/dL Final   • BUN/Creatinine Ratio 01/30/2020 11.3  7.0 - 25.0 Final   • Anion Gap 01/30/2020 11.1  5.0 - 15.0  mmol/L Final   • Total Cholesterol 01/30/2020 119  0 - 200 mg/dL Final   • Triglycerides 01/30/2020 44  0 - 150 mg/dL Final   • HDL Cholesterol 01/30/2020 43  40 - 60 mg/dL Final   • LDL Cholesterol  01/30/2020 67  0 - 100 mg/dL Final   • VLDL Cholesterol 01/30/2020 8.8  5 - 40 mg/dL Final   • LDL/HDL Ratio 01/30/2020 1.56   Final   • 25 Hydroxy, Vitamin D 01/30/2020 21.5* 30.0 - 100.0 ng/ml Final   • Iron 01/30/2020 42  37 - 145 mcg/dL Final   • Iron Saturation 01/30/2020 11* 20 - 50 % Final   • Transferrin 01/30/2020 264  200 - 360 mg/dL Final   • TIBC 01/30/2020 393  298 - 536 mcg/dL Final   • Ferritin 01/30/2020 26.10  13.00 - 150.00 ng/mL Final   • Hemoglobin A1C 01/30/2020 4.83  4.80 - 5.60 % Final      XR Spine Lumbar Complete 4+VW  Narrative: Procedure:  Lumbar spine        Indication:  Back pain   .    Technique:  Five views   .    Prior relevant exam:  None.      No evidence of acute bony, soft tissue, or joint abnormality is  noted. Bone mineralization is within normal limits. The  visualized joint spaces appear intact. Normal lumbar spine.  Impression: CONCLUSION:   1.  Normal lumbar spine.       Electronically signed by:  Alec Prescott MD  1/6/2020 2:45 PM [facility]  Workstation: [email or web address]    [email or web address]  Immunization History   Administered Date(s) Administered   • PPD Test 03/18/2020   • Pneumococcal Polysaccharide (PPSV23) 10/05/2017   • Tdap 10/05/2017       The following portions of the patient's history were reviewed and updated as appropriate: allergies, current medications, past family history, past medical history, past social history, past surgical history and problem list.        Physical Exam  Physical Exam   Constitutional: She is oriented to person, place, and time. She appears well-developed and well-nourished.   HENT:   Head: Normocephalic and atraumatic.   Right Ear: External ear normal.   Eyes: Pupils are equal, round, and reactive to light. Conjunctivae and EOM are normal.   Neck: Normal range  of motion. Neck supple.   Cardiovascular: Normal rate, regular rhythm and normal heart sounds.   No murmur heard.  Pulmonary/Chest: Effort normal and breath sounds normal. No respiratory distress.   Abdominal: Soft. Bowel sounds are normal. She exhibits no distension. There is no tenderness.   Musculoskeletal: Normal range of motion. She exhibits no edema or deformity.   Neurological: She is alert and oriented to person, place, and time. No cranial nerve deficit.   Skin: Skin is warm. No rash noted. She is not diaphoretic. No erythema. No pallor.   Psychiatric: She has a normal mood and affect. Her behavior is normal.   Nursing note and vitals reviewed.      Assessment/Plan    Diagnosis Plan   1. Tobacco user     2. Vitamin D deficiency     3. Iron deficiency anemia, unspecified iron deficiency anemia type     4. Dyslipidemia     5. Menorrhagia with regular cycle     6. Menstrual cramp     7. Urinary tract infection without hematuria, site unspecified          -went over labwork  -UTI - will get UA and urine culture. Start on macrobid 100 mg PO BID for 7 days    -leg pain/back pain -x-ray of lumbar spine. Recommend PT/OT. Start on mobic 15 mg daily. Gave drug information. Flexeril 5 mg at bedtime PRN   -recommend ibuprofen for her menstrual cramps but pt declined   -iron deficiency anemia/menorrhagia - ferrous sulfate 325 mg PO TID. Hematology following. Pt had iron infusion therapy. Was supposed to receive another one.   -leukocyosis/neurtophiia - Hematology following likely reactive.    -menorrhagia - recommend followup with OB/lGYN. Will refer back also will reorder transvaginal US at [facility]  -vitamin D deficiency - vitamin D3 50,000 units once a week   - tobacco user - nicotine patches reordered. Recommended 1 800 QUIT NOW. Counseled >5 minutes to quit. pack   -advised pt to be safe and call with questions and concerns  -advised pt to go to ER or call 911 if symptoms worrisome or severe  -advised pt to followup  with specialist and referrals   -advised pt be safe and take precaution during COVID-19 pandemic  This visit has been rescheduled as a phone visit to comply with patient safety concerns in accordance with CDC recommendations. Total time of discussion was 15 minutes.        This document has been electronically signed by Alvaro Vogel MD on April 10, 2020 09:12

## 2020-04-10 ENCOUNTER — OFFICE VISIT (OUTPATIENT)
Dept: FAMILY MEDICINE CLINIC | Facility: CLINIC | Age: 30
End: 2020-04-10

## 2020-04-10 VITALS — BODY MASS INDEX: 25.94 KG/M2 | HEIGHT: 67 IN

## 2020-04-10 DIAGNOSIS — Z72.0 TOBACCO USER: Primary | ICD-10-CM

## 2020-04-10 DIAGNOSIS — N94.6 MENSTRUAL CRAMP: ICD-10-CM

## 2020-04-10 DIAGNOSIS — E55.9 VITAMIN D DEFICIENCY: ICD-10-CM

## 2020-04-10 DIAGNOSIS — N39.0 URINARY TRACT INFECTION WITHOUT HEMATURIA, SITE UNSPECIFIED: ICD-10-CM

## 2020-04-10 DIAGNOSIS — E78.5 DYSLIPIDEMIA: ICD-10-CM

## 2020-04-10 DIAGNOSIS — N92.0 MENORRHAGIA WITH REGULAR CYCLE: ICD-10-CM

## 2020-04-10 DIAGNOSIS — D50.9 IRON DEFICIENCY ANEMIA, UNSPECIFIED IRON DEFICIENCY ANEMIA TYPE: ICD-10-CM

## 2020-04-10 PROCEDURE — 99212 OFFICE O/P EST SF 10 MIN: CPT | Performed by: FAMILY MEDICINE

## 2020-04-15 ENCOUNTER — LAB (OUTPATIENT)
Dept: LAB | Facility: HOSPITAL | Age: 30
End: 2020-04-15

## 2020-04-15 DIAGNOSIS — N39.0 URINARY TRACT INFECTION WITHOUT HEMATURIA, SITE UNSPECIFIED: ICD-10-CM

## 2020-04-15 LAB
BILIRUB UR QL STRIP: ABNORMAL
CLARITY UR: ABNORMAL
COLOR UR: YELLOW
GLUCOSE UR STRIP-MCNC: NEGATIVE MG/DL
HGB UR QL STRIP.AUTO: ABNORMAL
KETONES UR QL STRIP: ABNORMAL
LEUKOCYTE ESTERASE UR QL STRIP.AUTO: NEGATIVE
NITRITE UR QL STRIP: NEGATIVE
PH UR STRIP.AUTO: 6 [PH] (ref 5–8)
PROT UR QL STRIP: ABNORMAL
SP GR UR STRIP: 1.01 (ref 1–1.03)
UROBILINOGEN UR QL STRIP: ABNORMAL

## 2020-04-15 PROCEDURE — 81015 MICROSCOPIC EXAM OF URINE: CPT

## 2020-04-15 PROCEDURE — 87086 URINE CULTURE/COLONY COUNT: CPT

## 2020-04-15 PROCEDURE — 81003 URINALYSIS AUTO W/O SCOPE: CPT

## 2020-04-16 LAB
BACTERIA UR QL AUTO: ABNORMAL /HPF
HYALINE CASTS UR QL AUTO: ABNORMAL /LPF
RBC # UR: ABNORMAL /HPF
REF LAB TEST METHOD: ABNORMAL
SQUAMOUS #/AREA URNS HPF: ABNORMAL /HPF
WBC UR QL AUTO: ABNORMAL /HPF

## 2020-04-17 ENCOUNTER — TELEPHONE (OUTPATIENT)
Dept: FAMILY MEDICINE CLINIC | Facility: CLINIC | Age: 30
End: 2020-04-17

## 2020-04-17 LAB — BACTERIA SPEC AEROBE CULT: NO GROWTH

## 2020-04-17 RX ORDER — CEPHALEXIN 500 MG/1
500 CAPSULE ORAL 2 TIMES DAILY
Qty: 14 CAPSULE | Refills: 0 | Status: SHIPPED | OUTPATIENT
Start: 2020-04-17 | End: 2020-04-24

## 2020-04-17 NOTE — TELEPHONE ENCOUNTER
If not allergic switch to keflex 500 mg PO BID. Give 14 pills and no refills.    Have her check back with us in 1 week to see how she is doing. Thanks

## 2020-04-17 NOTE — TELEPHONE ENCOUNTER
Gave results and recommendations. Pt states that she will be finished with antibiotics today and she still is having frequent urination and low back pain. Please advise.

## 2020-04-17 NOTE — TELEPHONE ENCOUNTER
----- Message from Alvaro Vogel MD sent at 4/17/2020  8:14 AM CDT -----  Urine culture negative..  Continue with abx for UTI

## 2020-04-17 NOTE — TELEPHONE ENCOUNTER
----- Message from Alvaro Vogel MD sent at 4/17/2020  7:27 AM CDT -----  Pt has RBC in urine along with squamous epithelial cells. May be UTI continue with Abx    Continue folllowup thanks

## 2020-04-21 ENCOUNTER — TELEPHONE (OUTPATIENT)
Dept: FAMILY MEDICINE CLINIC | Facility: CLINIC | Age: 30
End: 2020-04-21

## 2020-04-21 PROBLEM — N30.00 ACUTE CYSTITIS WITHOUT HEMATURIA: Status: ACTIVE | Noted: 2020-04-21

## 2020-04-21 NOTE — TELEPHONE ENCOUNTER
----- Message from Alvaro Vogel MD sent at 4/21/2020 11:24 AM CDT -----  Regarding: transvaginal US   Please let pt know that transvaginal US showed thickened inner layer of endometriium at 1.7 cm.  It is recommended a 6 week followup to assess stability. Will discuss on next visit. If still thickened she may need a biopsy. She also will need to followup with her OB/GYN regarding this.  Let me know who she is currently seeing. Have followup appt with Ms. Yanez on 4/24/20. thanks

## 2020-04-21 NOTE — TELEPHONE ENCOUNTER
Called in to request a callback to go over ultrasound results.     PATIENT STATES STILL HAVING PAIN IN LEFT SIDE AND WANTS TO KNOW IF ANYTHING WAS FOUND    Callback Number confirmed     Please Advise

## 2020-04-21 NOTE — PROGRESS NOTES
Subjective:  Sarthak Yanez is a 29 y.o. female who presents for       Patient Active Problem List   Diagnosis   • Encounter for screening for endocrine disorder   • Encounter for screening for cardiovascular disorders   • Nonintractable headache   • Encounter for screening for diabetes mellitus   • Tobacco abuse counseling   • Tobacco user   • Encounter for tobacco use cessation counseling   • Acute pharyngitis   • Iron deficiency anemia   • Vitamin D deficiency   • Other fatigue   • Dyslipidemia   • Neutrophilia   • Menorrhagia with regular cycle   • Menstrual cramp   • Urinary tract infection without hematuria   • Acute cystitis without hematuria           Current Outpatient Medications:   •  cephalexin (KEFLEX) 500 MG capsule, Take 1 capsule by mouth 2 (Two) Times a Day for 7 days., Disp: 14 capsule, Rfl: 0  •  Cyanocobalamin (B-12) 1000 MCG sublingual tablet, Place 1,000 mcg under the tongue Daily., Disp: 90 each, Rfl: 3  •  cyclobenzaprine (FLEXERIL) 5 MG tablet, Take 1 tablet by mouth At Night As Needed for Muscle Spasms., Disp: 30 tablet, Rfl: 3  •  ferrous sulfate 325 (65 FE) MG tablet, Take 1 tablet by mouth 3 (Three) Times a Day With Meals., Disp: 90 tablet, Rfl: 3  •  folic acid (FOLVITE) 1 MG tablet, Take 1 tablet by mouth Daily., Disp: 90 tablet, Rfl: 3  •  nicotine (NICODERM CQ) 21 MG/24HR patch, Place 1 patch on the skin as directed by provider Daily., Disp: 30 patch, Rfl: 3  •  SPRINTEC 28 0.25-35 MG-MCG per tablet, , Disp: , Rfl:   •  vitamin D (ERGOCALCIFEROL) 1.25 MG (97821 UT) capsule capsule, Take 1 capsule by mouth Every 7 (Seven) Days., Disp: 4 capsule, Rfl: 3       Pt is 28 yo female with management of tobacco use, Vitamin D deficiency, iron deficiency anemia. Leukocytosis/neutrophilia, menorrhagia         1/6/20 pt is here for recheck and followup.  She states today her lower back has been hurting for a month. She picked up her daughter at home and it has been hurting since then.  She has not sought treatment until today. Pain I 8/10 on severity.  She has tried ibuprofen with no relief. No PT/OT.  She also states pain radiates from lower back down back of both legs.  No issues with bowel or bladder problems. Shecontinues to smoke tobacco. She did not get chantix filled due to cost. She is currently not pregnant. Her last menstrual period was last month. She works 2 jobs at [employer] and at [employer]      4/2/20 Telemedicine Visit.  Pt recently hand labwork done on 3/2/20 that showed iron profile. Showed iron saturation low at 11. CBC showed normal hemoglobin with WBC at 13.3. Ferritin was at 26.68.  She has seen Hematologyl/Oncology Dr. Amin for iron deficiency anemia and iron infusion therapy. Done on 3/9/20   Also had labwork on 1/30/20 that showed low vitamin D. Lipid panel and CMP were normal. CBC showed leukocytosis at 13.70 with elevated neurtrophil count. She is having pain on urination, lower back pain. Odor to urine. She is using restroom more frequently. No fever no chills. She is minor cough. No dizziness. She continues to smoke 1/2 ppd. She continues to have heavy menstrual cycle and has it 2-3 times a month     4/24/20 Telemedicine Visit today.  Pt has been having issues with UTI and initially was on macrobid but then switched to keflex 500 mg PO BID for 7 days. UA showed 3-5 RBC in urine with 7-12 squamous epithelial cells.  She also has history of iron deficiency anemia and had recent transvaginal US done on 4//17/20 that showed thickened endometrial wall at 1.7 cm.  A 6 week followup was recommended and if persistent biopsy was recommended. In regards to UTI it has resolved no fever no chills. She continues to have menorrhagia.  She sees an OB/GYN She  Recently saw MICHAEL Centeno who prescribed her birth control pills and pt was advised to take on next menstrual cycle. No fatigue no lethargy no syncopal episode     Menstrual Problem   This is a recurrent  problem. The current episode started more than 1 month ago. The problem occurs intermittently. The problem has been waxing and waning. Associated symptoms include abdominal pain, fatigue, numbness and weakness. Pertinent negatives include no anorexia, arthralgias, change in bowel habit, chest pain, chills, congestion, coughing, diaphoresis, fever, headaches, joint swelling, myalgias, nausea, neck pain, rash, sore throat, swollen glands, urinary symptoms, vertigo, visual change or vomiting. Nothing aggravates the symptoms. She has tried nothing for the symptoms. The treatment provided no relief.   Urinary Tract Infection    This is a new problem. The problem occurs every urination. The quality of the pain is described s mild he pain is at a severity of 3/10. The patient is experiencing no pain. There has been no fever. She is not sexually active. There is no history of pyelonephritis. Pertinent negatives include no chills, discharge, flank pain, frequency, hematuria, hesitancy, nausea, possible pregnancy, sweats, urgency or vomiting. The treatment provided no relief.   Anemia   Presents for follow-up visit. There has been no abdominal pain, anorexia, bruising/bleeding easily, confusion, fever, leg swelling, light-headedness, malaise/fatigue, pallor, palpitations, paresthesias, pica or weight loss. Signs of blood loss that are not present include hematemesis, hematochezia, melena, menorrhagia and vaginal bleeding.   Back Pain   This is a recurrent problem. The current episode started more than 1 month ago. The problem is unchanged. The pain is present in the gluteal and lumbar spine. The quality of the pain is described as aching. The pain does not radiate. The pain is at a severity of 4/10. The pain is moderate. The pain is the same all the time. The symptoms are aggravated by bending, lying down and position. Stiffness is present all day. Associated symptoms include leg pain, numbness, tingling and weakness.  Pertinent negatives include no abdominal pain, bladder incontinence, bowel incontinence, chest pain, dysuria, fever, headaches, paresis, paresthesias, pelvic pain, perianal numbness or weight loss. She has tried nothing for the symptoms. The treatment provided no relief.     Review of Systems  Review of Systems   Constitutional: Positive for activity change and fatigue. Negative for appetite change, chills, diaphoresis and fever.   HENT: Negative for congestion, postnasal drip, rhinorrhea, sinus pressure, sinus pain, sneezing, sore throat, trouble swallowing and voice change.    Respiratory: Negative for cough, choking, chest tightness, shortness of breath, wheezing and stridor.    Cardiovascular: Negative for chest pain.   Gastrointestinal: Positive for abdominal pain. Negative for anorexia, change in bowel habit, diarrhea, nausea and vomiting.   Genitourinary: Positive for dysuria, menstrual problem and pelvic pain.   Musculoskeletal: Negative for arthralgias, joint swelling, myalgias and neck pain.   Skin: Negative for rash.   Neurological: Positive for weakness and numbness. Negative for vertigo and headaches.       Patient Active Problem List   Diagnosis   • Encounter for screening for endocrine disorder   • Encounter for screening for cardiovascular disorders   • Nonintractable headache   • Encounter for screening for diabetes mellitus   • Tobacco abuse counseling   • Tobacco user   • Encounter for tobacco use cessation counseling   • Acute pharyngitis   • Iron deficiency anemia   • Vitamin D deficiency   • Other fatigue   • Dyslipidemia   • Neutrophilia   • Menorrhagia with regular cycle   • Menstrual cramp   • Urinary tract infection without hematuria   • Acute cystitis without hematuria     Past Surgical History:   Procedure Laterality Date   •  SECTION     • MOUTH SURGERY       Social History     Socioeconomic History   • Marital status:      Spouse name: Not on file   • Number of children:  Not on file   • Years of education: Not on file   • Highest education level: Not on file   Tobacco Use   • Smoking status: Current Every Day Smoker   • Smokeless tobacco: Never Used   Substance and Sexual Activity   • Alcohol use: No     Family History   Problem Relation Age of Onset   • Asthma Brother    • Hypertension Maternal Aunt    • Alcohol abuse Maternal Uncle    • Cancer Maternal Uncle    • Diabetes Maternal Grandmother      Lab on 04/15/2020   Component Date Value Ref Range Status   • Urine Culture 04/15/2020 No growth   Final   • Color, UA 04/15/2020 Yellow  Yellow, Straw Final   • Appearance, UA 04/15/2020 Cloudy* Clear Final   • pH, UA 04/15/2020 6.0  5.0 - 8.0 Final   • Specific Gravity, UA 04/15/2020 1.010  1.005 - 1.030 Final   • Glucose, UA 04/15/2020 Negative  Negative Final   • Ketones, UA 04/15/2020 15 mg/dL (1+)* Negative Final   • Bilirubin, UA 04/15/2020 Small (1+)* Negative Final   • Blood, UA 04/15/2020 Small (1+)* Negative Final   • Protein, UA 04/15/2020 30 mg/dL (1+)* Negative Final   • Leuk Esterase, UA 04/15/2020 Negative  Negative Final   • Nitrite, UA 04/15/2020 Negative  Negative Final   • Urobilinogen, UA 04/15/2020 0.2 E.U./dL  0.2 - 1.0 E.U./dL Final   • RBC, UA 04/15/2020 3-5* None Seen, 0-2 /HPF Final   • WBC, UA 04/15/2020 0-2  None Seen, 0-2 /HPF Final   • Bacteria, UA 04/15/2020 None Seen  None Seen /HPF Final   • Squamous Epithelial Cells, UA 04/15/2020 7-12* None Seen, 0-2 /HPF Final   • Hyaline Casts, UA 04/15/2020 0-2  None Seen /LPF Final   • Methodology 04/15/2020 Automated Microscopy   Final   Lab on 03/02/2020   Component Date Value Ref Range Status   • Ferritin 03/02/2020 26.68  13.00 - 150.00 ng/mL Final   • Iron 03/02/2020 40  37 - 145 mcg/dL Final   • Iron Saturation 03/02/2020 11* 20 - 50 % Final   • Transferrin 03/02/2020 247  200 - 360 mg/dL Final   • TIBC 03/02/2020 368  298 - 536 mcg/dL Final   • WBC 03/02/2020 13.55* 3.40 - 10.80 10*3/mm3 Final   • RBC  03/02/2020 5.00  3.77 - 5.28 10*6/mm3 Final   • Hemoglobin 03/02/2020 13.8  12.0 - 15.9 g/dL Final   • Hematocrit 03/02/2020 40.2  34.0 - 46.6 % Final   • MCV 03/02/2020 80.4  79.0 - 97.0 fL Final   • MCH 03/02/2020 27.6  26.6 - 33.0 pg Final   • MCHC 03/02/2020 34.3  31.5 - 35.7 g/dL Final   • RDW 03/02/2020 13.8  12.3 - 15.4 % Final   • RDW-SD 03/02/2020 40.1  37.0 - 54.0 fl Final   • MPV 03/02/2020 10.4  6.0 - 12.0 fL Final   • Platelets 03/02/2020 368  140 - 450 10*3/mm3 Final   • Neutrophil % 03/02/2020 75.1  42.7 - 76.0 % Final   • Lymphocyte % 03/02/2020 13.0* 19.6 - 45.3 % Final   • Monocyte % 03/02/2020 6.6  5.0 - 12.0 % Final   • Eosinophil % 03/02/2020 4.3  0.3 - 6.2 % Final   • Basophil % 03/02/2020 0.7  0.0 - 1.5 % Final   • Immature Grans % 03/02/2020 0.3  0.0 - 0.5 % Final   • Neutrophils, Absolute 03/02/2020 10.18* 1.70 - 7.00 10*3/mm3 Final   • Lymphocytes, Absolute 03/02/2020 1.76  0.70 - 3.10 10*3/mm3 Final   • Monocytes, Absolute 03/02/2020 0.89  0.10 - 0.90 10*3/mm3 Final   • Eosinophils, Absolute 03/02/2020 0.58* 0.00 - 0.40 10*3/mm3 Final   • Basophils, Absolute 03/02/2020 0.10  0.00 - 0.20 10*3/mm3 Final   • Immature Grans, Absolute 03/02/2020 0.04  0.00 - 0.05 10*3/mm3 Final   • nRBC 03/02/2020 0.0  0.0 - 0.2 /100 WBC Final   Lab on 01/30/2020   Component Date Value Ref Range Status   • WBC 01/30/2020 13.70* 3.40 - 10.80 10*3/mm3 Final   • RBC 01/30/2020 4.67  3.77 - 5.28 10*6/mm3 Final   • Hemoglobin 01/30/2020 13.1  12.0 - 15.9 g/dL Final   • Hematocrit 01/30/2020 39.2  34.0 - 46.6 % Final   • MCV 01/30/2020 83.9  79.0 - 97.0 fL Final   • MCH 01/30/2020 28.1  26.6 - 33.0 pg Final   • MCHC 01/30/2020 33.4  31.5 - 35.7 g/dL Final   • RDW 01/30/2020 14.1  12.3 - 15.4 % Final   • RDW-SD 01/30/2020 43.4  37.0 - 54.0 fl Final   • MPV 01/30/2020 11.4  6.0 - 12.0 fL Final   • Platelets 01/30/2020 364  140 - 450 10*3/mm3 Final   • Neutrophil % 01/30/2020 77.8* 42.7 - 76.0 % Final   • Lymphocyte %  01/30/2020 11.5* 19.6 - 45.3 % Final   • Monocyte % 01/30/2020 5.3  5.0 - 12.0 % Final   • Eosinophil % 01/30/2020 4.6  0.3 - 6.2 % Final   • Basophil % 01/30/2020 0.6  0.0 - 1.5 % Final   • Immature Grans % 01/30/2020 0.2  0.0 - 0.5 % Final   • Neutrophils, Absolute 01/30/2020 10.67* 1.70 - 7.00 10*3/mm3 Final   • Lymphocytes, Absolute 01/30/2020 1.57  0.70 - 3.10 10*3/mm3 Final   • Monocytes, Absolute 01/30/2020 0.72  0.10 - 0.90 10*3/mm3 Final   • Eosinophils, Absolute 01/30/2020 0.63* 0.00 - 0.40 10*3/mm3 Final   • Basophils, Absolute 01/30/2020 0.08  0.00 - 0.20 10*3/mm3 Final   • Immature Grans, Absolute 01/30/2020 0.03  0.00 - 0.05 10*3/mm3 Final   • nRBC 01/30/2020 0.0  0.0 - 0.2 /100 WBC Final   • Glucose 01/30/2020 84  65 - 99 mg/dL Final   • BUN 01/30/2020 8  6 - 20 mg/dL Final   • Creatinine 01/30/2020 0.71  0.57 - 1.00 mg/dL Final   • Sodium 01/30/2020 140  136 - 145 mmol/L Final   • Potassium 01/30/2020 3.7  3.5 - 5.2 mmol/L Final   • Chloride 01/30/2020 105  98 - 107 mmol/L Final   • CO2 01/30/2020 23.9  22.0 - 29.0 mmol/L Final   • Calcium 01/30/2020 8.9  8.6 - 10.5 mg/dL Final   • Total Protein 01/30/2020 6.9  6.0 - 8.5 g/dL Final   • Albumin 01/30/2020 4.10  3.50 - 5.20 g/dL Final   • ALT (SGPT) 01/30/2020 10  1 - 33 U/L Final   • AST (SGOT) 01/30/2020 15  1 - 32 U/L Final   • Alkaline Phosphatase 01/30/2020 46  39 - 117 U/L Final   • Total Bilirubin 01/30/2020 0.3  0.2 - 1.2 mg/dL Final   • eGFR  African Amer 01/30/2020 118  >60 mL/min/1.73 Final   • Globulin 01/30/2020 2.8  gm/dL Final   • A/G Ratio 01/30/2020 1.5  g/dL Final   • BUN/Creatinine Ratio 01/30/2020 11.3  7.0 - 25.0 Final   • Anion Gap 01/30/2020 11.1  5.0 - 15.0 mmol/L Final   • Total Cholesterol 01/30/2020 119  0 - 200 mg/dL Final   • Triglycerides 01/30/2020 44  0 - 150 mg/dL Final   • HDL Cholesterol 01/30/2020 43  40 - 60 mg/dL Final   • LDL Cholesterol  01/30/2020 67  0 - 100 mg/dL Final   • VLDL Cholesterol 01/30/2020 8.8  5 -  40 mg/dL Final   • LDL/HDL Ratio 01/30/2020 1.56   Final   • 25 Hydroxy, Vitamin D 01/30/2020 21.5* 30.0 - 100.0 ng/ml Final   • Iron 01/30/2020 42  37 - 145 mcg/dL Final   • Iron Saturation 01/30/2020 11* 20 - 50 % Final   • Transferrin 01/30/2020 264  200 - 360 mg/dL Final   • TIBC 01/30/2020 393  298 - 536 mcg/dL Final   • Ferritin 01/30/2020 26.10  13.00 - 150.00 ng/mL Final   • Hemoglobin A1C 01/30/2020 4.83  4.80 - 5.60 % Final      XR Spine Lumbar Complete 4+VW  Narrative: Procedure:  Lumbar spine        Indication:  Back pain   .    Technique:  Five views   .    Prior relevant exam:  None.      No evidence of acute bony, soft tissue, or joint abnormality is  noted. Bone mineralization is within normal limits. The  visualized joint spaces appear intact. Normal lumbar spine.  Impression: CONCLUSION:   1.  Normal lumbar spine.       Electronically signed by:  Alec Prescott MD  1/6/2020 2:45 PM [facility]  Workstation: [email or web address]    [email or web address]@  Immunization History   Administered Date(s) Administered   • PPD Test 03/18/2020   • Pneumococcal Polysaccharide (PPSV23) 10/05/2017   • Tdap 10/05/2017       The following portions of the patient's history were reviewed and updated as appropriate: allergies, current medications, past family history, past medical history, past social history, past surgical history and problem list.        Physical Exam  Physical Exam   Constitutional: She is oriented to person, place, and time. She appears well-developed and well-nourished.   HENT:   Head: Normocephalic and atraumatic.   Right Ear: External ear normal.   Eyes: Pupils are equal, round, and reactive to light. Conjunctivae and EOM are normal.   Neck: Normal range of motion. Neck supple.   Cardiovascular: Normal rate, regular rhythm and normal heart sounds.   No murmur heard.  Pulmonary/Chest: Effort normal and breath sounds normal. No respiratory distress.   Abdominal: Soft. Bowel sounds are normal. She exhibits no distension. There  is no tenderness.   Musculoskeletal: Normal range of motion. She exhibits no edema or deformity.   Neurological: She is alert and oriented to person, place, and time. No cranial nerve deficit.   Skin: Skin is warm. No rash noted. She is not diaphoretic. No erythema. No pallor.   Psychiatric: She has a normal mood and affect. Her behavior is normal.   Nursing note and vitals reviewed.      Assessment/Plan    Diagnosis Plan   1. Vitamin D deficiency     2. Tobacco abuse counseling     3. Other fatigue     4. Menorrhagia with regular cycle     5. Iron deficiency anemia, unspecified iron deficiency anemia type     6. Dyslipidemia     7. Acute cystitis without hematuria          -went over labwork  -UTI - will get UA and urine culture. Start on macrobid 100 mg PO BID for 7 days   -leg pain/back pain -x-ray of lumbar spine. Recommend PT/OT. Start on mobic 15 mg daily. Gave drug information. Flexeril 5 mg at bedtime PRN   -iron deficiency anemia/menorrhagia - ferrous sulfate 325 mg PO TID. Hematology following. Pt had iron infusion therapy. Was supposed to receive another one..  Recent transvaginal US showed thickened endometrium. She saw her OB/GYN. And was started on birth control. On next menstrual cycle. She was recently ordered a repeat Transvaginal. Advised pt to call Dr. Amin's office with Hematology for 2nd iron infusion therapy or if pt develops fatigue or lethargy. Advised pt to cut down on smoking if taking birth control   -leukocyosis/neutrophilia - Hematology following likely reactive.    -menorrhagia - recommend followup with OB/lGYN. Will refer back also will [facility]  -vitamin D deficiency - vitamin D3 50,000 units once a week   - tobacco user - nicotine patches reordered. Recommended 1 800 QUIT NOW. Counseled >5 minutes to quit. Will try chantix starting pack and maintenance pack   -advised pt to be safe and call with questions and concerns  -advised pt to go to ER or call 911 if  symptoms worrisome or severe  -advised pt to followup with specialist and referrals   -advised pt be safe and take precaution during COVID-19 pandemic  This visit has been rescheduled as a phone visit to comply with patient safety concerns in accordance with CDC recommendations. Total time of discussion was 25  minutes.   -recheck in 6 weeks         This document has been electronically signed by Alvaro Vogel MD on April 24, 2020 09:14

## 2020-04-23 DIAGNOSIS — N92.0 MENORRHAGIA WITH REGULAR CYCLE: ICD-10-CM

## 2020-04-23 DIAGNOSIS — D50.9 IRON DEFICIENCY ANEMIA, UNSPECIFIED IRON DEFICIENCY ANEMIA TYPE: ICD-10-CM

## 2020-04-24 ENCOUNTER — OFFICE VISIT (OUTPATIENT)
Dept: FAMILY MEDICINE CLINIC | Facility: CLINIC | Age: 30
End: 2020-04-24

## 2020-04-24 VITALS — HEIGHT: 67 IN | BODY MASS INDEX: 25.94 KG/M2

## 2020-04-24 DIAGNOSIS — E78.5 DYSLIPIDEMIA: ICD-10-CM

## 2020-04-24 DIAGNOSIS — E55.9 VITAMIN D DEFICIENCY: Primary | ICD-10-CM

## 2020-04-24 DIAGNOSIS — D50.9 IRON DEFICIENCY ANEMIA, UNSPECIFIED IRON DEFICIENCY ANEMIA TYPE: ICD-10-CM

## 2020-04-24 DIAGNOSIS — N92.0 MENORRHAGIA WITH REGULAR CYCLE: ICD-10-CM

## 2020-04-24 DIAGNOSIS — N30.00 ACUTE CYSTITIS WITHOUT HEMATURIA: ICD-10-CM

## 2020-04-24 DIAGNOSIS — R53.83 OTHER FATIGUE: ICD-10-CM

## 2020-04-24 DIAGNOSIS — Z71.6 TOBACCO ABUSE COUNSELING: ICD-10-CM

## 2020-04-24 PROCEDURE — 99442 PR PHYS/QHP TELEPHONE EVALUATION 11-20 MIN: CPT | Performed by: FAMILY MEDICINE

## 2020-04-27 DIAGNOSIS — D50.9 IRON DEFICIENCY ANEMIA, UNSPECIFIED IRON DEFICIENCY ANEMIA TYPE: Primary | ICD-10-CM

## 2020-05-01 ENCOUNTER — TELEPHONE (OUTPATIENT)
Dept: FAMILY MEDICINE CLINIC | Facility: CLINIC | Age: 30
End: 2020-05-01

## 2020-05-01 RX ORDER — FLUCONAZOLE 150 MG/1
150 TABLET ORAL ONCE
Qty: 2 TABLET | Refills: 0 | Status: SHIPPED | OUTPATIENT
Start: 2020-05-01 | End: 2020-05-01

## 2020-05-01 NOTE — TELEPHONE ENCOUNTER
Called in to request medication be sent to pharmacy.     States that she has vaginal discomfort and yeast infection caused by antibiotics    Pharmacy confirmed      Patient requested a callback to confirm if the medication approved.

## 2020-05-04 ENCOUNTER — INFUSION (OUTPATIENT)
Dept: ONCOLOGY | Facility: HOSPITAL | Age: 30
End: 2020-05-04

## 2020-05-04 ENCOUNTER — APPOINTMENT (OUTPATIENT)
Dept: ONCOLOGY | Facility: HOSPITAL | Age: 30
End: 2020-05-04

## 2020-05-04 VITALS — DIASTOLIC BLOOD PRESSURE: 65 MMHG | HEART RATE: 86 BPM | TEMPERATURE: 98.2 F | SYSTOLIC BLOOD PRESSURE: 102 MMHG

## 2020-05-04 DIAGNOSIS — D50.9 IRON DEFICIENCY ANEMIA, UNSPECIFIED IRON DEFICIENCY ANEMIA TYPE: Primary | ICD-10-CM

## 2020-05-04 PROCEDURE — 25010000002 FERRIC CARBOXYMALTOSE 750 MG/15ML SOLUTION 15 ML VIAL: Performed by: INTERNAL MEDICINE

## 2020-05-04 PROCEDURE — 96374 THER/PROPH/DIAG INJ IV PUSH: CPT | Performed by: INTERNAL MEDICINE

## 2020-05-04 RX ORDER — SODIUM CHLORIDE 9 MG/ML
250 INJECTION, SOLUTION INTRAVENOUS ONCE
Status: CANCELLED | OUTPATIENT
Start: 2020-05-11

## 2020-05-04 RX ORDER — METHYLPREDNISOLONE SODIUM SUCCINATE 125 MG/2ML
125 INJECTION, POWDER, LYOPHILIZED, FOR SOLUTION INTRAMUSCULAR; INTRAVENOUS AS NEEDED
OUTPATIENT
Start: 2020-05-11

## 2020-05-04 RX ORDER — SODIUM CHLORIDE 9 MG/ML
250 INJECTION, SOLUTION INTRAVENOUS ONCE
Status: COMPLETED | OUTPATIENT
Start: 2020-05-04 | End: 2020-05-04

## 2020-05-04 RX ORDER — DIPHENHYDRAMINE HYDROCHLORIDE 50 MG/ML
50 INJECTION INTRAMUSCULAR; INTRAVENOUS AS NEEDED
OUTPATIENT
Start: 2020-05-11

## 2020-05-04 RX ADMIN — FERRIC CARBOXYMALTOSE INJECTION 750 MG: 50 INJECTION, SOLUTION INTRAVENOUS at 14:26

## 2020-05-04 RX ADMIN — SODIUM CHLORIDE 250 ML: 9 INJECTION, SOLUTION INTRAVENOUS at 14:22

## 2020-06-05 ENCOUNTER — OFFICE VISIT (OUTPATIENT)
Dept: FAMILY MEDICINE CLINIC | Facility: CLINIC | Age: 30
End: 2020-06-05

## 2020-06-05 VITALS — HEIGHT: 67 IN | BODY MASS INDEX: 25.94 KG/M2

## 2020-06-05 DIAGNOSIS — E55.9 VITAMIN D DEFICIENCY: ICD-10-CM

## 2020-06-05 DIAGNOSIS — Z72.0 TOBACCO USER: Primary | ICD-10-CM

## 2020-06-05 DIAGNOSIS — E78.5 DYSLIPIDEMIA: ICD-10-CM

## 2020-06-05 DIAGNOSIS — D50.9 IRON DEFICIENCY ANEMIA, UNSPECIFIED IRON DEFICIENCY ANEMIA TYPE: ICD-10-CM

## 2020-06-05 PROCEDURE — 99212 OFFICE O/P EST SF 10 MIN: CPT | Performed by: FAMILY MEDICINE

## 2020-08-24 NOTE — PROGRESS NOTES
Subjective:  Sarthak Galvez is a 29 y.o. female who presents for       Patient Active Problem List   Diagnosis   • Encounter for screening for endocrine disorder   • Encounter for screening for cardiovascular disorders   • Nonintractable headache   • Encounter for screening for diabetes mellitus   • Tobacco abuse counseling   • Tobacco user   • Encounter for tobacco use cessation counseling   • Acute pharyngitis   • Iron deficiency anemia   • Vitamin D deficiency   • Other fatigue   • Dyslipidemia   • Neutrophilia   • Menorrhagia with regular cycle   • Menstrual cramp   • Urinary tract infection without hematuria   • Acute cystitis without hematuria           Current Outpatient Medications:   •  ferrous sulfate 325 (65 FE) MG tablet, Take 1 tablet by mouth 3 (Three) Times a Day With Meals., Disp: 90 tablet, Rfl: 3  •  folic acid (FOLVITE) 1 MG tablet, Take 1 tablet by mouth Daily., Disp: 90 tablet, Rfl: 3    HPI          Pt is 30 yo female with management of tobacco use, Vitamin D deficiency, iron deficiency anemia. Leukocytosis/neutrophilia, menorrhagia      6/5/20 Telemedicine visit today for recheck on pt's menorrhagia, fatigue and iron deficiency anemia. She continues to take OCP sprintec daily for her menorrhagia. She missed her appt with Hematology. Menstrual cycle is better.   No fatigue. No dizziness.   She was treated for UTI last time. That has since resolved.  No chest pain no fever. She is still working at [employer]. She has cut back on smoking to 1/2 ppd.    8/25/20 in office visit for recheck on pt's above medical issues. Pt has history of menorrhagia and has transvaginal US done in April 2020 that showed thickened endometrium. It was recommend repeat imaging to assess stability. She does see her OB/GYN Vonnie Centeno in [place]. Pt also has iron deficiency anemia and was seeing Dr. Amin but no showed for her appt on 6/4/20.  She also has yet to get labwork ordered on  4/27/20 . She is needing a new referral to OB?GYn. She is also about to start school and will need immunizatoin status on Tdap,MMR and varicella. 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   She continues to smoke tobacco and has stopped using nicotine patches      Menstrual Problem   This is a recurrent problem. The current episode started more than 1 month ago. The problem occurs intermittently. The problem has been  improving  Associated symptoms include abdominal pain, fatigue, numbness and weakness. Pertinent negatives include no anorexia, arthralgias, change in bowel habit, chest pain, chills, congestion, coughing, diaphoresis, fever, headaches, joint swelling, myalgias, nausea, neck pain, rash, sore throat, swollen glands, urinary symptoms, vertigo, visual change or vomiting. Nothing aggravates the symptoms. She has tried nothing for the symptoms. The treatment provided no relief. .   Anemia   Presents for follow-up visit. There has been no abdominal pain, anorexia, bruising/bleeding easily, confusion, fever, leg swelling, light-headedness, malaise/fatigue, pallor, palpitations, paresthesias, pica or weight loss. Signs of blood loss that are not present include hematemesis, hematochezia, melena, menorrhagia and vaginal bleeding.      Review of Systems  Review of Systems   Constitutional: Positive for activity change and fatigue. Negative for appetite change, chills, diaphoresis and fever.   HENT: Negative for congestion, postnasal drip, rhinorrhea, sinus pressure, sinus pain, sneezing, sore throat, trouble swallowing and voice change.    Respiratory: Negative for cough, choking, chest tightness, shortness of breath, wheezing and stridor.    Cardiovascular: Negative for chest pain.   Gastrointestinal: Negative for diarrhea, nausea and vomiting.   Genitourinary: Positive for menstrual problem.   Musculoskeletal: Positive for arthralgias.   Neurological: Positive for weakness and numbness. Negative for  headaches.       Patient Active Problem List   Diagnosis   • Encounter for screening for endocrine disorder   • Encounter for screening for cardiovascular disorders   • Nonintractable headache   • Encounter for screening for diabetes mellitus   • Tobacco abuse counseling   • Tobacco user   • Encounter for tobacco use cessation counseling   • Acute pharyngitis   • Iron deficiency anemia   • Vitamin D deficiency   • Other fatigue   • Dyslipidemia   • Neutrophilia   • Menorrhagia with regular cycle   • Menstrual cramp   • Urinary tract infection without hematuria   • Acute cystitis without hematuria     Past Surgical History:   Procedure Laterality Date   •  SECTION     • MOUTH SURGERY       Social History     Socioeconomic History   • Marital status:      Spouse name: Not on file   • Number of children: Not on file   • Years of education: Not on file   • Highest education level: Not on file   Tobacco Use   • Smoking status: Current Every Day Smoker   • Smokeless tobacco: Never Used   Substance and Sexual Activity   • Alcohol use: No     Family History   Problem Relation Age of Onset   • Asthma Brother    • Hypertension Maternal Aunt    • Alcohol abuse Maternal Uncle    • Cancer Maternal Uncle    • Diabetes Maternal Grandmother      Lab on 04/15/2020   Component Date Value Ref Range Status   • Urine Culture 04/15/2020 No growth   Final   • Color, UA 04/15/2020 Yellow  Yellow, Straw Final   • Appearance, UA 04/15/2020 Cloudy* Clear Final   • pH, UA 04/15/2020 6.0  5.0 - 8.0 Final   • Specific Gravity, UA 04/15/2020 1.010  1.005 - 1.030 Final   • Glucose, UA 04/15/2020 Negative  Negative Final   • Ketones, UA 04/15/2020 15 mg/dL (1+)* Negative Final   • Bilirubin, UA 04/15/2020 Small (1+)* Negative Final   • Blood, UA 04/15/2020 Small (1+)* Negative Final   • Protein, UA 04/15/2020 30 mg/dL (1+)* Negative Final   • Leuk Esterase, UA 04/15/2020 Negative  Negative Final   • Nitrite, UA 04/15/2020 Negative   Negative Final   • Urobilinogen, UA 04/15/2020 0.2 E.U./dL  0.2 - 1.0 E.U./dL Final   • RBC, UA 04/15/2020 3-5* None Seen, 0-2 /HPF Final   • WBC, UA 04/15/2020 0-2  None Seen, 0-2 /HPF Final   • Bacteria, UA 04/15/2020 None Seen  None Seen /HPF Final   • Squamous Epithelial Cells, UA 04/15/2020 7-12* None Seen, 0-2 /HPF Final   • Hyaline Casts, UA 04/15/2020 0-2  None Seen /LPF Final   • Methodology 04/15/2020 Automated Microscopy   Final   Lab on 03/02/2020   Component Date Value Ref Range Status   • Ferritin 03/02/2020 26.68  13.00 - 150.00 ng/mL Final   • Iron 03/02/2020 40  37 - 145 mcg/dL Final   • Iron Saturation 03/02/2020 11* 20 - 50 % Final   • Transferrin 03/02/2020 247  200 - 360 mg/dL Final   • TIBC 03/02/2020 368  298 - 536 mcg/dL Final   • WBC 03/02/2020 13.55* 3.40 - 10.80 10*3/mm3 Final   • RBC 03/02/2020 5.00  3.77 - 5.28 10*6/mm3 Final   • Hemoglobin 03/02/2020 13.8  12.0 - 15.9 g/dL Final   • Hematocrit 03/02/2020 40.2  34.0 - 46.6 % Final   • MCV 03/02/2020 80.4  79.0 - 97.0 fL Final   • MCH 03/02/2020 27.6  26.6 - 33.0 pg Final   • MCHC 03/02/2020 34.3  31.5 - 35.7 g/dL Final   • RDW 03/02/2020 13.8  12.3 - 15.4 % Final   • RDW-SD 03/02/2020 40.1  37.0 - 54.0 fl Final   • MPV 03/02/2020 10.4  6.0 - 12.0 fL Final   • Platelets 03/02/2020 368  140 - 450 10*3/mm3 Final   • Neutrophil % 03/02/2020 75.1  42.7 - 76.0 % Final   • Lymphocyte % 03/02/2020 13.0* 19.6 - 45.3 % Final   • Monocyte % 03/02/2020 6.6  5.0 - 12.0 % Final   • Eosinophil % 03/02/2020 4.3  0.3 - 6.2 % Final   • Basophil % 03/02/2020 0.7  0.0 - 1.5 % Final   • Immature Grans % 03/02/2020 0.3  0.0 - 0.5 % Final   • Neutrophils, Absolute 03/02/2020 10.18* 1.70 - 7.00 10*3/mm3 Final   • Lymphocytes, Absolute 03/02/2020 1.76  0.70 - 3.10 10*3/mm3 Final   • Monocytes, Absolute 03/02/2020 0.89  0.10 - 0.90 10*3/mm3 Final   • Eosinophils, Absolute 03/02/2020 0.58* 0.00 - 0.40 10*3/mm3 Final   • Basophils, Absolute 03/02/2020 0.10  0.00 -  "0.20 10*3/mm3 Final   • Immature Grans, Absolute 03/02/2020 0.04  0.00 - 0.05 10*3/mm3 Final   • nRBC 03/02/2020 0.0  0.0 - 0.2 /100 WBC Final      XR Spine Lumbar Complete 4+VW  Narrative: Procedure:  Lumbar spine        Indication:  Back pain   .    Technique:  Five views   .    Prior relevant exam:  None.      No evidence of acute bony, soft tissue, or joint abnormality is  noted. Bone mineralization is within normal limits. The  visualized joint spaces appear intact. Normal lumbar spine.  Impression: CONCLUSION:   1.  Normal lumbar spine.       Electronically signed by:  Alec Prescott MD  1/6/2020 2:45 PM [facility]  Workstation: [email or web address]    [email or web address]  Immunization History   Administered Date(s) Administered   • PPD Test 03/18/2020   • Pneumococcal Polysaccharide (PPSV23) 10/05/2017   • Tdap 10/05/2017       The following portions of the patient's history were reviewed and updated as appropriate: allergies, current medications, past family history, past medical history, past social history, past surgical history and problem list.        Physical Exam  /62 (BP Location: Right arm, Patient Position: Sitting, Cuff Size: Adult)   Pulse 92 Comment: manually  Temp 98.6 °F (37 °C) Comment: per screener  Ht 170.2 cm (67\")   Wt 72.7 kg (160 lb 3.2 oz)   LMP 08/16/2020   BMI 25.09 kg/m²     Physical Exam   Constitutional: She is oriented to person, place, and time. She appears well-developed and well-nourished.   HENT:   Head: Normocephalic and atraumatic.   Right Ear: External ear normal.   Eyes: Pupils are equal, round, and reactive to light. Conjunctivae and EOM are normal.   Neck: Normal range of motion. Neck supple.   Cardiovascular: Normal rate, regular rhythm and normal heart sounds.   No murmur heard.  Pulmonary/Chest: Effort normal and breath sounds normal. No respiratory distress.   Abdominal: Soft. Bowel sounds are normal. She exhibits no distension. There is no tenderness.   Musculoskeletal: Normal range " of motion. She exhibits no edema or deformity.   Neurological: She is alert and oriented to person, place, and time. No cranial nerve deficit.   Skin: Skin is warm. No rash noted. She is not diaphoretic. No erythema. No pallor.   Psychiatric: She has a normal mood and affect. Her behavior is normal.   Nursing note and vitals reviewed.      Assessment/Plan    Diagnosis Plan   1. Menorrhagia with regular cycle  Ambulatory Referral to Obstetrics / Gynecology   2. Iron deficiency anemia, unspecified iron deficiency anemia type  CBC Auto Differential    Comprehensive Metabolic Panel    Iron Profile    Ferritin    Vitamin B12   3. Tobacco abuse counseling     4. Need for hepatitis C screening test  Hepatitis C antibody   5. Vitamin D deficiency  Vitamin D 25 Hydroxy    CBC Auto Differential    Comprehensive Metabolic Panel    Iron Profile    Ferritin    Vitamin B12   6. B12 deficiency  Vitamin B12   7. Encounter for immunization  Varicella Zoster Antibodies, IgG / IgM    Measles / Mumps / Rubella Immunity              -recommend labowrk   -annual physical exam today  -hep c screening - hep c antibody   -check MMR and varicella antibodies.- wrote letter today for school and if negative will give MMR and varicella   -TB skin test today  -tdap  Vaccination today    -recommend influenza vaccination   -leg pain/back pain -recommended PT/OT on flexril PRN   -iron deficiency anemia/menorrhagia - ferrous sulfate 325 mg PO TID. Hematology following. Pt had iron infusion therapy. Was supposed to receive another one..  Recent transvaginal US showed thickened endometrium. She saw her OB/GYN. And was started on birth control. On next menstrual cycle. She was recently ordered a repeat Transvaginal. Advised pt to call Dr. Amin's office with Hematology for 2nd iron infusion therapy or if pt develops fatigue or lethargy. Advised pt to cut down on smoking if taking birth control.  She will need to call for appt.  Refer back to OB/GN    -leukocyosis/neutrophilia - Hematology following likely reactive.    -menorrhagia - OB/GYN Vonnie Centeno in [place] following On sprintec   -vitamin D deficiency - vitamin D3 50,000 units once a week   -tobacco user - nicotine patches reordered. Recommended 1 800 QUIT NOW. Counseled >5 minutes to quit. Will try chantix starting pack and maintenance pack   -advised pt to be safe and call with questions and concerns  -advised pt to go to ER or call 911 if symptoms worrisome or severe  -advised pt to followup with specialist and referrals   -advised pt be safe and take precaution during COVID-19 pandemic  -total time with pt >25 minutes  -recheck in 4 weeks         This document has been electronically signed by Alvaro Vogel MD on August 25, 2020 11:04

## 2020-08-25 ENCOUNTER — LAB (OUTPATIENT)
Dept: LAB | Facility: HOSPITAL | Age: 30
End: 2020-08-25

## 2020-08-25 ENCOUNTER — OFFICE VISIT (OUTPATIENT)
Dept: FAMILY MEDICINE CLINIC | Facility: CLINIC | Age: 30
End: 2020-08-25

## 2020-08-25 VITALS
TEMPERATURE: 98.6 F | SYSTOLIC BLOOD PRESSURE: 112 MMHG | DIASTOLIC BLOOD PRESSURE: 62 MMHG | HEIGHT: 67 IN | BODY MASS INDEX: 25.15 KG/M2 | WEIGHT: 160.2 LBS | HEART RATE: 92 BPM

## 2020-08-25 DIAGNOSIS — Z23 ENCOUNTER FOR IMMUNIZATION: ICD-10-CM

## 2020-08-25 DIAGNOSIS — D50.9 IRON DEFICIENCY ANEMIA, UNSPECIFIED IRON DEFICIENCY ANEMIA TYPE: ICD-10-CM

## 2020-08-25 DIAGNOSIS — E53.8 B12 DEFICIENCY: ICD-10-CM

## 2020-08-25 DIAGNOSIS — E55.9 VITAMIN D DEFICIENCY: ICD-10-CM

## 2020-08-25 DIAGNOSIS — Z71.6 TOBACCO ABUSE COUNSELING: ICD-10-CM

## 2020-08-25 DIAGNOSIS — Z11.59 NEED FOR HEPATITIS C SCREENING TEST: ICD-10-CM

## 2020-08-25 DIAGNOSIS — N92.0 MENORRHAGIA WITH REGULAR CYCLE: Primary | ICD-10-CM

## 2020-08-25 DIAGNOSIS — Z11.1 ENCOUNTER FOR PPD TEST: ICD-10-CM

## 2020-08-25 PROCEDURE — 82607 VITAMIN B-12: CPT

## 2020-08-25 PROCEDURE — 90471 IMMUNIZATION ADMIN: CPT | Performed by: FAMILY MEDICINE

## 2020-08-25 PROCEDURE — 82728 ASSAY OF FERRITIN: CPT

## 2020-08-25 PROCEDURE — 86580 TB INTRADERMAL TEST: CPT | Performed by: FAMILY MEDICINE

## 2020-08-25 PROCEDURE — 99214 OFFICE O/P EST MOD 30 MIN: CPT | Performed by: FAMILY MEDICINE

## 2020-08-25 PROCEDURE — 86765 RUBEOLA ANTIBODY: CPT

## 2020-08-25 PROCEDURE — 90715 TDAP VACCINE 7 YRS/> IM: CPT | Performed by: FAMILY MEDICINE

## 2020-08-25 PROCEDURE — 83540 ASSAY OF IRON: CPT

## 2020-08-25 PROCEDURE — 80053 COMPREHEN METABOLIC PANEL: CPT

## 2020-08-25 PROCEDURE — 86803 HEPATITIS C AB TEST: CPT

## 2020-08-25 PROCEDURE — 82306 VITAMIN D 25 HYDROXY: CPT

## 2020-08-25 PROCEDURE — 86787 VARICELLA-ZOSTER ANTIBODY: CPT

## 2020-08-25 PROCEDURE — 86735 MUMPS ANTIBODY: CPT

## 2020-08-25 PROCEDURE — 86762 RUBELLA ANTIBODY: CPT

## 2020-08-25 PROCEDURE — 85025 COMPLETE CBC W/AUTO DIFF WBC: CPT

## 2020-08-25 PROCEDURE — 84466 ASSAY OF TRANSFERRIN: CPT

## 2020-08-25 RX ORDER — ERGOCALCIFEROL 1.25 MG/1
50000 CAPSULE ORAL
Qty: 4 CAPSULE | Refills: 3 | Status: SHIPPED | OUTPATIENT
Start: 2020-08-25

## 2020-08-25 RX ORDER — FOLIC ACID 1 MG/1
1 TABLET ORAL DAILY
Qty: 90 TABLET | Refills: 3 | Status: SHIPPED | OUTPATIENT
Start: 2020-08-25 | End: 2020-08-25 | Stop reason: SDUPTHER

## 2020-08-25 RX ORDER — FOLIC ACID 1 MG/1
1 TABLET ORAL DAILY
Qty: 90 TABLET | Refills: 3 | Status: SHIPPED | OUTPATIENT
Start: 2020-08-25

## 2020-08-25 RX ORDER — FERROUS SULFATE 325(65) MG
325 TABLET ORAL
Qty: 90 TABLET | Refills: 3 | Status: SHIPPED | OUTPATIENT
Start: 2020-08-25 | End: 2020-08-25 | Stop reason: SDUPTHER

## 2020-08-25 RX ORDER — FERROUS SULFATE 325(65) MG
325 TABLET ORAL
Qty: 90 TABLET | Refills: 3 | Status: SHIPPED | OUTPATIENT
Start: 2020-08-25

## 2020-08-25 NOTE — PATIENT INSTRUCTIONS
Call and make appt with DR. Amin    Also call and make appt with Vonnie Centeno       Get TB skin test recheck by end of Thusrday on 8/27/20

## 2020-08-26 LAB
25(OH)D3 SERPL-MCNC: 23.8 NG/ML (ref 30–100)
ALBUMIN SERPL-MCNC: 4.4 G/DL (ref 3.5–5.2)
ALBUMIN/GLOB SERPL: 1.8 G/DL
ALP SERPL-CCNC: 52 U/L (ref 39–117)
ALT SERPL W P-5'-P-CCNC: 13 U/L (ref 1–33)
ANION GAP SERPL CALCULATED.3IONS-SCNC: 10.9 MMOL/L (ref 5–15)
AST SERPL-CCNC: 13 U/L (ref 1–32)
BASOPHILS # BLD AUTO: 0.12 10*3/MM3 (ref 0–0.2)
BASOPHILS NFR BLD AUTO: 1 % (ref 0–1.5)
BILIRUB SERPL-MCNC: 0.3 MG/DL (ref 0–1.2)
BUN SERPL-MCNC: 7 MG/DL (ref 6–20)
BUN/CREAT SERPL: 10.3 (ref 7–25)
CALCIUM SPEC-SCNC: 9.1 MG/DL (ref 8.6–10.5)
CHLORIDE SERPL-SCNC: 106 MMOL/L (ref 98–107)
CO2 SERPL-SCNC: 22.1 MMOL/L (ref 22–29)
CREAT SERPL-MCNC: 0.68 MG/DL (ref 0.57–1)
DEPRECATED RDW RBC AUTO: 38.4 FL (ref 37–54)
EOSINOPHIL # BLD AUTO: 0.45 10*3/MM3 (ref 0–0.4)
EOSINOPHIL NFR BLD AUTO: 3.9 % (ref 0.3–6.2)
ERYTHROCYTE [DISTWIDTH] IN BLOOD BY AUTOMATED COUNT: 12.8 % (ref 12.3–15.4)
FERRITIN SERPL-MCNC: 56.2 NG/ML (ref 13–150)
GFR SERPL CREATININE-BSD FRML MDRD: 124 ML/MIN/1.73
GLOBULIN UR ELPH-MCNC: 2.4 GM/DL
GLUCOSE SERPL-MCNC: 94 MG/DL (ref 65–99)
HCT VFR BLD AUTO: 39.3 % (ref 34–46.6)
HCV AB SER DONR QL: NORMAL
HGB BLD-MCNC: 13.5 G/DL (ref 12–15.9)
IMM GRANULOCYTES # BLD AUTO: 0.03 10*3/MM3 (ref 0–0.05)
IMM GRANULOCYTES NFR BLD AUTO: 0.3 % (ref 0–0.5)
IRON 24H UR-MRATE: 51 MCG/DL (ref 37–145)
IRON SATN MFR SERPL: 14 % (ref 20–50)
LYMPHOCYTES # BLD AUTO: 1.78 10*3/MM3 (ref 0.7–3.1)
LYMPHOCYTES NFR BLD AUTO: 15.5 % (ref 19.6–45.3)
MCH RBC QN AUTO: 28.4 PG (ref 26.6–33)
MCHC RBC AUTO-ENTMCNC: 34.4 G/DL (ref 31.5–35.7)
MCV RBC AUTO: 82.7 FL (ref 79–97)
MONOCYTES # BLD AUTO: 0.71 10*3/MM3 (ref 0.1–0.9)
MONOCYTES NFR BLD AUTO: 6.2 % (ref 5–12)
NEUTROPHILS NFR BLD AUTO: 73.1 % (ref 42.7–76)
NEUTROPHILS NFR BLD AUTO: 8.36 10*3/MM3 (ref 1.7–7)
NRBC BLD AUTO-RTO: 0 /100 WBC (ref 0–0.2)
PLATELET # BLD AUTO: 331 10*3/MM3 (ref 140–450)
PMV BLD AUTO: 11.5 FL (ref 6–12)
POTASSIUM SERPL-SCNC: 4.2 MMOL/L (ref 3.5–5.2)
PROT SERPL-MCNC: 6.8 G/DL (ref 6–8.5)
RBC # BLD AUTO: 4.75 10*6/MM3 (ref 3.77–5.28)
SODIUM SERPL-SCNC: 139 MMOL/L (ref 136–145)
TIBC SERPL-MCNC: 367 MCG/DL (ref 298–536)
TRANSFERRIN SERPL-MCNC: 246 MG/DL (ref 200–360)
VIT B12 BLD-MCNC: 1650 PG/ML (ref 211–946)
WBC # BLD AUTO: 11.45 10*3/MM3 (ref 3.4–10.8)

## 2020-08-27 DIAGNOSIS — N39.0 URINARY TRACT INFECTION WITHOUT HEMATURIA, SITE UNSPECIFIED: Primary | ICD-10-CM

## 2020-08-27 LAB
INDURATION: 0 MM (ref 0–10)
Lab: NORMAL
Lab: NORMAL
MEV IGG SER IA-ACNC: 17.6 AU/ML
MUV IGG SER IA-ACNC: 134 AU/ML
RUBV IGG SERPL IA-ACNC: 1.88 INDEX
TB SKIN TEST: NEGATIVE
VZV IGG SER IA-ACNC: 289 INDEX
VZV IGM SER IA-ACNC: <0.91 INDEX (ref 0–0.9)

## 2020-08-28 ENCOUNTER — TELEPHONE (OUTPATIENT)
Dept: FAMILY MEDICINE CLINIC | Facility: CLINIC | Age: 30
End: 2020-08-28

## 2020-08-28 NOTE — TELEPHONE ENCOUNTER
----- Message from Alvaro Vogel MD sent at 8/27/2020  3:30 PM CDT -----  Please call pt    Pt has antibodies to both varicella and MMR.  She has immunity and had received the vaccine     Vitamin D is low and if pt is not taking start on Vitamin D3 50,000 units once a week give 4 pills and 3 refills    Vitamin B12 levels elevated and recommend if pt taking supplement to take every other day instead of daily.      On CMP kidney function and liver function stable     On iron studies show low iron at 14 and continue with iron supplements three times a day. Pt to take with vitamin C for better absorption     CBC shows slightly elevated WBC  With elevated neutrophil count. Suggest bacterial infection  Ordered Urine studies.  Will call with results    If pt would like to  her results to bring to school have her go to Medical Records for her immunization status. Thanks        Recheck on next visit

## 2020-09-10 ENCOUNTER — TELEPHONE (OUTPATIENT)
Dept: FAMILY MEDICINE CLINIC | Facility: CLINIC | Age: 30
End: 2020-09-10

## 2020-09-10 DIAGNOSIS — Z00.00 GENERAL MEDICAL EXAMINATION: Primary | ICD-10-CM

## 2020-09-10 NOTE — TELEPHONE ENCOUNTER
Pt is requesting a blood pregnancy test ordered. Pt stated last period was on 08/16. Pt stated that she took a home test and had 2 faint lines and that she usually has to determine if pregnant through blood.

## 2020-09-21 ENCOUNTER — LAB (OUTPATIENT)
Dept: LAB | Facility: HOSPITAL | Age: 30
End: 2020-09-21

## 2020-09-21 DIAGNOSIS — N39.0 URINARY TRACT INFECTION WITHOUT HEMATURIA, SITE UNSPECIFIED: ICD-10-CM

## 2020-09-21 DIAGNOSIS — Z00.00 GENERAL MEDICAL EXAMINATION: ICD-10-CM

## 2020-09-21 LAB
BILIRUB UR QL STRIP: NEGATIVE
CLARITY UR: CLEAR
COLOR UR: YELLOW
GLUCOSE UR STRIP-MCNC: NEGATIVE MG/DL
HGB UR QL STRIP.AUTO: NEGATIVE
KETONES UR QL STRIP: ABNORMAL
LEUKOCYTE ESTERASE UR QL STRIP.AUTO: ABNORMAL
NITRITE UR QL STRIP: NEGATIVE
PH UR STRIP.AUTO: 5 [PH] (ref 5–8)
PROT UR QL STRIP: ABNORMAL
SP GR UR STRIP: 1.02 (ref 1–1.03)
UROBILINOGEN UR QL STRIP: ABNORMAL

## 2020-09-21 PROCEDURE — 81015 MICROSCOPIC EXAM OF URINE: CPT

## 2020-09-21 PROCEDURE — 81003 URINALYSIS AUTO W/O SCOPE: CPT

## 2020-09-21 PROCEDURE — 84703 CHORIONIC GONADOTROPIN ASSAY: CPT

## 2020-09-21 PROCEDURE — 87086 URINE CULTURE/COLONY COUNT: CPT

## 2020-09-21 PROCEDURE — 84702 CHORIONIC GONADOTROPIN TEST: CPT

## 2020-09-22 ENCOUNTER — TELEPHONE (OUTPATIENT)
Dept: FAMILY MEDICINE CLINIC | Facility: CLINIC | Age: 30
End: 2020-09-22

## 2020-09-22 LAB
BACTERIA UR QL AUTO: ABNORMAL /HPF
HCG INTACT+B SERPL-ACNC: 177.3 MIU/ML
HCG SERPL QL: POSITIVE
HYALINE CASTS UR QL AUTO: ABNORMAL /LPF
RBC # UR: ABNORMAL /HPF
REF LAB TEST METHOD: ABNORMAL
SQUAMOUS #/AREA URNS HPF: ABNORMAL /HPF
WBC UR QL AUTO: ABNORMAL /HPF

## 2020-09-22 NOTE — TELEPHONE ENCOUNTER
PATIENT IS REQUESTING A CALL BACK FOR HER LAB RESULTS         GOOD CONTACT NUMBER   [phone] (H)

## 2020-09-23 ENCOUNTER — TELEPHONE (OUTPATIENT)
Dept: FAMILY MEDICINE CLINIC | Facility: CLINIC | Age: 30
End: 2020-09-23

## 2020-09-23 LAB — BACTERIA SPEC AEROBE CULT: NO GROWTH

## 2020-09-23 RX ORDER — PNV NO.95/FERROUS FUM/FOLIC AC 28MG-0.8MG
1 TABLET ORAL DAILY
Qty: 30 TABLET | Refills: 11 | Status: SHIPPED | OUTPATIENT
Start: 2020-09-23

## 2020-09-23 RX ORDER — NITROFURANTOIN 25; 75 MG/1; MG/1
100 CAPSULE ORAL 2 TIMES DAILY
Qty: 10 CAPSULE | Refills: 0 | Status: SHIPPED | OUTPATIENT
Start: 2020-09-23 | End: 2020-09-28

## 2020-09-23 RX ORDER — NITROFURANTOIN 25; 75 MG/1; MG/1
100 CAPSULE ORAL 2 TIMES DAILY
Qty: 10 CAPSULE | Refills: 0 | Status: SHIPPED | OUTPATIENT
Start: 2020-09-23 | End: 2020-09-23 | Stop reason: SDUPTHER

## 2020-09-23 NOTE — TELEPHONE ENCOUNTER
----- Message from Alvaro Vogel MD sent at 9/22/2020 10:02 PM CDT -----  Please let pt know that pt has positive pregnancy test and highly recommend pt see OB/GYN ASAP.  Her Beta Hcg qualitative test suggest she is 4 weeks at gestation. Recommend OB[facility]N at [facility]   Also recommend pt start taking prenatal vitamins.  If she would like for me to prescribe me some let me know. Thanks     Also has UTI and recommend pt start taking macrobid 100 mg PO BID for 5 days give 10 pills and no refills.      Recheck on next visit. Thanks

## 2020-09-23 NOTE — TELEPHONE ENCOUNTER
Called pt and discussed about recent UTI. Awaiting urine culture.  Advised pt to hold taking macrobid for 5 days for now since it can increase risk of birth defects in First Trimester. I advised pt to see OB/GYN ASAP and has upcoming appt on 10/7/20.  She currently reports no symptoms of UTI/vaginal pain/fever.  Will call pt and notify if results of urine culture positive.  I advised taking prenatal vitamins and also to refrain from smoking tobacco since it can increase risk of  labor. Pt voiced understanding and all questions answered.         This document has been electronically signed by Alvaro Vogel MD on 2020 08:38 CDT

## 2020-09-23 NOTE — TELEPHONE ENCOUNTER
Gave pt results and recommendations. Pt voiced understanding and stated she would like  to prescribe prenatal vitamin and is agreeable to medications. Pt stated she has OB/GYN on post that she will make an appointment with.

## 2020-09-23 NOTE — TELEPHONE ENCOUNTER
----- Message from Alvaro Vogel MD sent at 2020  8:34 AM CDT -----  Regarding: patient  Called pt and discussed about recent UTI. Awaiting urine culture.  Advised pt to hold taking macrobid for 5 days for now since it can increase risk of birth defects in First Trimester. I advised pt to see OB/GYN ASAP and has upcoming appt on 10/7/20.  Will call pt and notify if results of urine culture positive.  I advised taking prenatal vitamins and also to refrain from smoking tobacco since it can increase risk of  labor. Pt voiced understanding and all questions answered.

## 2020-09-24 ENCOUNTER — TELEPHONE (OUTPATIENT)
Dept: FAMILY MEDICINE CLINIC | Facility: CLINIC | Age: 30
End: 2020-09-24

## 2020-09-24 NOTE — TELEPHONE ENCOUNTER
----- Message from Alvaro Vogel MD sent at 9/24/2020  7:20 AM CDT -----  Urine culture negative. Hold taking macrobid.  Have pt followup with OB/GYN.  Proceed with taking prenatal vitamins    Recheck on next visit. Thanks

## 2020-10-12 ENCOUNTER — TELEPHONE (OUTPATIENT)
Dept: FAMILY MEDICINE CLINIC | Facility: CLINIC | Age: 30
End: 2020-10-12

## 2020-10-12 NOTE — TELEPHONE ENCOUNTER
Called pt to see if she had her PCM changed to  and pt stated no because [facility] would not do it for some reason. I informed her that we could not do the referral until it was changed. I informed her she would have to contact [facility] and find and request a OB/GYN and or have PCM changed or find out why they would not change it. Pt voiced understanding. Pt has been told this numerous times.